# Patient Record
Sex: FEMALE | Race: WHITE | NOT HISPANIC OR LATINO
[De-identification: names, ages, dates, MRNs, and addresses within clinical notes are randomized per-mention and may not be internally consistent; named-entity substitution may affect disease eponyms.]

---

## 2023-07-11 ENCOUNTER — TRANSCRIPTION ENCOUNTER (OUTPATIENT)
Age: 33
End: 2023-07-11

## 2023-07-11 ENCOUNTER — NON-APPOINTMENT (OUTPATIENT)
Age: 33
End: 2023-07-11

## 2023-07-11 ENCOUNTER — APPOINTMENT (OUTPATIENT)
Dept: INTERNAL MEDICINE | Facility: CLINIC | Age: 33
End: 2023-07-11
Payer: COMMERCIAL

## 2023-07-11 VITALS
SYSTOLIC BLOOD PRESSURE: 115 MMHG | DIASTOLIC BLOOD PRESSURE: 80 MMHG | HEART RATE: 88 BPM | TEMPERATURE: 98.2 F | OXYGEN SATURATION: 99 % | RESPIRATION RATE: 15 BRPM | HEIGHT: 67 IN | BODY MASS INDEX: 26.37 KG/M2 | WEIGHT: 168 LBS

## 2023-07-11 DIAGNOSIS — Z78.9 OTHER SPECIFIED HEALTH STATUS: ICD-10-CM

## 2023-07-11 DIAGNOSIS — Z12.4 ENCOUNTER FOR SCREENING FOR MALIGNANT NEOPLASM OF CERVIX: ICD-10-CM

## 2023-07-11 DIAGNOSIS — Z82.49 FAMILY HISTORY OF ISCHEMIC HEART DISEASE AND OTHER DISEASES OF THE CIRCULATORY SYSTEM: ICD-10-CM

## 2023-07-11 DIAGNOSIS — Z60.2 PROBLEMS RELATED TO LIVING ALONE: ICD-10-CM

## 2023-07-11 DIAGNOSIS — Z00.00 ENCOUNTER FOR GENERAL ADULT MEDICAL EXAMINATION W/OUT ABNORMAL FINDINGS: ICD-10-CM

## 2023-07-11 PROCEDURE — G0101: CPT

## 2023-07-11 PROCEDURE — 36415 COLL VENOUS BLD VENIPUNCTURE: CPT

## 2023-07-11 PROCEDURE — 99385 PREV VISIT NEW AGE 18-39: CPT | Mod: 25

## 2023-07-11 SDOH — SOCIAL STABILITY - SOCIAL INSECURITY: PROBLEMS RELATED TO LIVING ALONE: Z60.2

## 2023-07-14 PROBLEM — Z12.4 SCREENING FOR CERVICAL CANCER: Status: RESOLVED | Noted: 2023-07-11 | Resolved: 2023-07-16

## 2023-07-14 PROBLEM — Z78.9 NO FAMILY HISTORY OF COLORECTAL CANCER: Status: ACTIVE | Noted: 2023-07-14

## 2023-07-14 PROBLEM — Z78.9 NO FAMILY HISTORY OF HYPERTENSION: Status: ACTIVE | Noted: 2023-07-14

## 2023-07-14 PROBLEM — Z78.9 NO FAMILY HISTORY OF GLAUCOMA: Status: ACTIVE | Noted: 2023-07-14

## 2023-07-14 PROBLEM — Z78.9 NO FAMILY HISTORY OF OVARIAN CANCER: Status: ACTIVE | Noted: 2023-07-14

## 2023-07-14 PROBLEM — Z00.00 ENCOUNTER FOR PREVENTIVE HEALTH EXAMINATION: Status: ACTIVE | Noted: 2023-06-30

## 2023-07-14 PROBLEM — Z78.9 NO FAMILY HISTORY OF BREAST CANCER: Status: ACTIVE | Noted: 2023-07-14

## 2023-07-14 PROBLEM — Z78.9 NO FAMILY HISTORY OF DIABETES MELLITUS: Status: ACTIVE | Noted: 2023-07-14

## 2023-07-14 PROBLEM — Z60.2 LIVES ALONE: Status: ACTIVE | Noted: 2023-07-14

## 2023-07-14 PROBLEM — Z82.49 FHX: ISCHEMIC HEART DISEASE: Status: ACTIVE | Noted: 2023-07-14

## 2023-07-14 LAB
ALBUMIN SERPL ELPH-MCNC: 4.9 G/DL
ALP BLD-CCNC: 63 U/L
ALT SERPL-CCNC: 11 U/L
ANION GAP SERPL CALC-SCNC: 11 MMOL/L
AST SERPL-CCNC: 22 U/L
BILIRUB SERPL-MCNC: 0.2 MG/DL
BUN SERPL-MCNC: 9 MG/DL
C TRACH RRNA SPEC QL NAA+PROBE: NOT DETECTED
C TRACH RRNA SPEC QL NAA+PROBE: NOT DETECTED
CALCIUM SERPL-MCNC: 10.1 MG/DL
CHLORIDE SERPL-SCNC: 103 MMOL/L
CHOLEST SERPL-MCNC: 213 MG/DL
CO2 SERPL-SCNC: 25 MMOL/L
CREAT SERPL-MCNC: 0.61 MG/DL
CYTOLOGY CVX/VAG DOC THIN PREP: NORMAL
EGFR: 120 ML/MIN/1.73M2
ESTIMATED AVERAGE GLUCOSE: 105 MG/DL
GLUCOSE SERPL-MCNC: 87 MG/DL
HBA1C MFR BLD HPLC: 5.3 %
HBV CORE IGG+IGM SER QL: NONREACTIVE
HBV SURFACE AB SER QL: REACTIVE
HCV AB SER QL: NONREACTIVE
HCV S/CO RATIO: 0.06 S/CO
HDLC SERPL-MCNC: 66 MG/DL
HIV1+2 AB SPEC QL IA.RAPID: NONREACTIVE
HPV HIGH+LOW RISK DNA PNL CVX: NOT DETECTED
LDLC SERPL CALC-MCNC: 130 MG/DL
MEV IGG FLD QL IA: 56.8 AU/ML
MEV IGG+IGM SER-IMP: POSITIVE
N GONORRHOEA RRNA SPEC QL NAA+PROBE: NOT DETECTED
N GONORRHOEA RRNA SPEC QL NAA+PROBE: NOT DETECTED
NONHDLC SERPL-MCNC: 148 MG/DL
POTASSIUM SERPL-SCNC: 4.6 MMOL/L
PROT SERPL-MCNC: 7.3 G/DL
RUBV IGG FLD-ACNC: 7.1 INDEX
RUBV IGG SER-IMP: POSITIVE
SODIUM SERPL-SCNC: 139 MMOL/L
SOURCE AMPLIFICATION: NORMAL
SOURCE TP AMPLIFICATION: NORMAL
T PALLIDUM AB SER QL IA: NEGATIVE
TRIGL SERPL-MCNC: 97 MG/DL

## 2023-07-14 NOTE — PHYSICAL EXAM
[Normal Sclera/Conjunctiva] : normal sclera/conjunctiva [Normal TMs] : both tympanic membranes were normal [No Edema] : there was no peripheral edema [Normal Appearance] : normal in appearance [No Masses] : no palpable masses [No Nipple Discharge] : no nipple discharge [No Axillary Lymphadenopathy] : no axillary lymphadenopathy [Urethral Meatus] : normal urethra [Urinary Bladder Findings] : the bladder was normal on palpation [External Female Genitalia] : normal external genitalia [Vagina] : normal vaginal exam [Cervix] : normal cervix [Uterus] : uterus was normal size, without masses or tenderness [Uterine Adnexae] : normal adnexa [No Skin Lesions] : no skin lesions [Deep Tendon Reflexes (DTR)] : deep tendon reflexes were 2+ and symmetric [Normal] : affect was normal and insight and judgment were intact [de-identified] : Offered chaperone for physical, patient accepted, DEAN Harrell available for chaperoning per patient's request.

## 2023-07-14 NOTE — HISTORY OF PRESENT ILLNESS
[FreeTextEntry1] : Annual check up and meet new provider [de-identified] : Patient presents for her annual check up and to meet new primary care provider. No health issues, no medications.  \par \par No allergies to medications or food. \par \par Surgeries: none\par \par FH: Father with heart disease, \par Mother with uterine fibroids, \par \par No ovarian or breast cancer.  No colon cancer. \par \par Denies any Hypertension, diabetes and no glaucoma in family\par \par SH: lives alone, single, no relationship.  Detector, safe, seat belt, no guns.  Grew up in [place], recently moved from [place] where she was living for 10 years.  Works teacher, special education.\par \par Gyne: never pregnant, last pap smear about 3 years ago.  \par \par \par Immunization: flu shot in 11/22, Covid moderna 2/14/21 and second one as well. \par Tdap 2 years ago

## 2023-07-14 NOTE — HEALTH RISK ASSESSMENT
[PHQ-2 Negative - No further assessment needed] : PHQ-2 Negative - No further assessment needed [[id number]] : 0 [Patient reported PAP Smear was normal] : Patient reported PAP Smear was normal [None] : None [Alone] : lives alone [Employed] : employed [Sexually Active] : sexually active [Feels Safe at Home] : Feels safe at home [Reports changes in hearing] : Reports no changes in hearing [Reports changes in vision] : Reports no changes in vision [Reports changes in dental health] : Reports no changes in dental health [Smoke Detector] : smoke detector [Carbon Monoxide Detector] : carbon monoxide detector [Guns at Home] : no guns at home [Seat Belt] :  uses seat belt [PapSmearDate] : 07/20 [Never] : Never

## 2023-07-14 NOTE — ASSESSMENT
[FreeTextEntry1] : Assessment as indicated above in impressions with plans through orders below. Lab orders placed, serum being drawn in office today.  Reviewed and discussed with patient age appropriate screening and immunizations.  Encouraged obtain new Covid booster simultaneously with flu shot in upcoming Fall season.

## 2024-01-31 ENCOUNTER — NON-APPOINTMENT (OUTPATIENT)
Age: 34
End: 2024-01-31

## 2024-06-06 ENCOUNTER — APPOINTMENT (OUTPATIENT)
Dept: HUMAN REPRODUCTION | Facility: CLINIC | Age: 34
End: 2024-06-06

## 2024-06-26 ENCOUNTER — NON-APPOINTMENT (OUTPATIENT)
Age: 34
End: 2024-06-26

## 2024-07-20 ENCOUNTER — NON-APPOINTMENT (OUTPATIENT)
Age: 34
End: 2024-07-20

## 2024-11-18 ENCOUNTER — NON-APPOINTMENT (OUTPATIENT)
Age: 34
End: 2024-11-18

## 2024-11-26 ENCOUNTER — APPOINTMENT (OUTPATIENT)
Dept: ANTEPARTUM | Facility: CLINIC | Age: 34
End: 2024-11-26
Payer: COMMERCIAL

## 2024-11-26 ENCOUNTER — ASOB RESULT (OUTPATIENT)
Age: 34
End: 2024-11-26

## 2024-11-26 PROCEDURE — 76805 OB US >/= 14 WKS SNGL FETUS: CPT

## 2025-01-14 ENCOUNTER — NON-APPOINTMENT (OUTPATIENT)
Age: 35
End: 2025-01-14

## 2025-04-17 ENCOUNTER — INPATIENT (INPATIENT)
Facility: HOSPITAL | Age: 35
LOS: 3 days | Discharge: HOME | End: 2025-04-21
Attending: STUDENT IN AN ORGANIZED HEALTH CARE EDUCATION/TRAINING PROGRAM | Admitting: STUDENT IN AN ORGANIZED HEALTH CARE EDUCATION/TRAINING PROGRAM
Payer: COMMERCIAL

## 2025-04-17 VITALS
WEIGHT: 210.1 LBS | OXYGEN SATURATION: 98 % | RESPIRATION RATE: 18 BRPM | DIASTOLIC BLOOD PRESSURE: 95 MMHG | HEART RATE: 89 BPM | HEIGHT: 67 IN | TEMPERATURE: 98 F | SYSTOLIC BLOOD PRESSURE: 154 MMHG

## 2025-04-17 LAB
ALBUMIN SERPL ELPH-MCNC: 3.2 G/DL — LOW (ref 3.3–5)
ALP SERPL-CCNC: 110 U/L — SIGNIFICANT CHANGE UP (ref 40–120)
ALT FLD-CCNC: 9 U/L — LOW (ref 10–45)
ANION GAP SERPL CALC-SCNC: 14 MMOL/L — SIGNIFICANT CHANGE UP (ref 5–17)
APPEARANCE UR: ABNORMAL
AST SERPL-CCNC: 18 U/L — SIGNIFICANT CHANGE UP (ref 10–40)
BACTERIA # UR AUTO: ABNORMAL /HPF
BASOPHILS # BLD AUTO: 0.04 K/UL — SIGNIFICANT CHANGE UP (ref 0–0.2)
BASOPHILS NFR BLD AUTO: 0.3 % — SIGNIFICANT CHANGE UP (ref 0–2)
BILIRUB SERPL-MCNC: 0.2 MG/DL — SIGNIFICANT CHANGE UP (ref 0.2–1.2)
BILIRUB UR-MCNC: NEGATIVE — SIGNIFICANT CHANGE UP
BLD GP AB SCN SERPL QL: POSITIVE — SIGNIFICANT CHANGE UP
BUN SERPL-MCNC: 5 MG/DL — LOW (ref 7–23)
CALCIUM SERPL-MCNC: 8.8 MG/DL — SIGNIFICANT CHANGE UP (ref 8.4–10.5)
CAST: 6 /LPF — HIGH (ref 0–4)
CHLORIDE SERPL-SCNC: 105 MMOL/L — SIGNIFICANT CHANGE UP (ref 96–108)
CO2 SERPL-SCNC: 19 MMOL/L — LOW (ref 22–31)
COLOR SPEC: YELLOW — SIGNIFICANT CHANGE UP
CREAT SERPL-MCNC: 0.4 MG/DL — LOW (ref 0.5–1.3)
DIFF PNL FLD: ABNORMAL
EGFR: 132 ML/MIN/1.73M2 — SIGNIFICANT CHANGE UP
EGFR: 132 ML/MIN/1.73M2 — SIGNIFICANT CHANGE UP
EOSINOPHIL # BLD AUTO: 0.08 K/UL — SIGNIFICANT CHANGE UP (ref 0–0.5)
EOSINOPHIL NFR BLD AUTO: 0.7 % — SIGNIFICANT CHANGE UP (ref 0–6)
GLUCOSE SERPL-MCNC: 100 MG/DL — HIGH (ref 70–99)
GLUCOSE UR QL: NEGATIVE MG/DL — SIGNIFICANT CHANGE UP
HCT VFR BLD CALC: 31.5 % — LOW (ref 34.5–45)
HGB BLD-MCNC: 10.9 G/DL — LOW (ref 11.5–15.5)
IMM GRANULOCYTES NFR BLD AUTO: 1 % — HIGH (ref 0–0.9)
KETONES UR-MCNC: NEGATIVE MG/DL — SIGNIFICANT CHANGE UP
LDH SERPL L TO P-CCNC: 203 U/L — SIGNIFICANT CHANGE UP (ref 50–242)
LEUKOCYTE ESTERASE UR-ACNC: ABNORMAL
LYMPHOCYTES # BLD AUTO: 18.4 % — SIGNIFICANT CHANGE UP (ref 13–44)
LYMPHOCYTES # BLD AUTO: 2.25 K/UL — SIGNIFICANT CHANGE UP (ref 1–3.3)
MCHC RBC-ENTMCNC: 30.8 PG — SIGNIFICANT CHANGE UP (ref 27–34)
MCHC RBC-ENTMCNC: 34.6 G/DL — SIGNIFICANT CHANGE UP (ref 32–36)
MCV RBC AUTO: 89 FL — SIGNIFICANT CHANGE UP (ref 80–100)
MONOCYTES # BLD AUTO: 0.61 K/UL — SIGNIFICANT CHANGE UP (ref 0–0.9)
MONOCYTES NFR BLD AUTO: 5 % — SIGNIFICANT CHANGE UP (ref 2–14)
NEUTROPHILS # BLD AUTO: 9.13 K/UL — HIGH (ref 1.8–7.4)
NEUTROPHILS NFR BLD AUTO: 74.6 % — SIGNIFICANT CHANGE UP (ref 43–77)
NITRITE UR-MCNC: NEGATIVE — SIGNIFICANT CHANGE UP
NRBC BLD AUTO-RTO: 0 /100 WBCS — SIGNIFICANT CHANGE UP (ref 0–0)
PH UR: 6.5 — SIGNIFICANT CHANGE UP (ref 5–8)
PLATELET # BLD AUTO: 180 K/UL — SIGNIFICANT CHANGE UP (ref 150–400)
POTASSIUM SERPL-MCNC: 3.3 MMOL/L — LOW (ref 3.5–5.3)
POTASSIUM SERPL-SCNC: 3.3 MMOL/L — LOW (ref 3.5–5.3)
PROT SERPL-MCNC: 5.9 G/DL — LOW (ref 6–8.3)
PROT UR-MCNC: SIGNIFICANT CHANGE UP MG/DL
RBC # BLD: 3.54 M/UL — LOW (ref 3.8–5.2)
RBC # FLD: 13.7 % — SIGNIFICANT CHANGE UP (ref 10.3–14.5)
RBC CASTS # UR COMP ASSIST: 11 /HPF — HIGH (ref 0–4)
REVIEW: SIGNIFICANT CHANGE UP
RH IG SCN BLD-IMP: NEGATIVE — SIGNIFICANT CHANGE UP
RH IG SCN BLD-IMP: NEGATIVE — SIGNIFICANT CHANGE UP
SODIUM SERPL-SCNC: 138 MMOL/L — SIGNIFICANT CHANGE UP (ref 135–145)
SP GR SPEC: 1.02 — SIGNIFICANT CHANGE UP (ref 1–1.03)
SQUAMOUS # UR AUTO: 8 /HPF — HIGH (ref 0–5)
URATE SERPL-MCNC: 3.2 MG/DL — SIGNIFICANT CHANGE UP (ref 2.5–7)
UROBILINOGEN FLD QL: 1 MG/DL — SIGNIFICANT CHANGE UP (ref 0.2–1)
WBC # BLD: 12.23 K/UL — HIGH (ref 3.8–10.5)
WBC # FLD AUTO: 12.23 K/UL — HIGH (ref 3.8–10.5)
WBC UR QL: 111 /HPF — HIGH (ref 0–5)

## 2025-04-17 PROCEDURE — 86077 PHYS BLOOD BANK SERV XMATCH: CPT

## 2025-04-17 RX ORDER — SODIUM CHLORIDE 9 G/1000ML
1000 INJECTION, SOLUTION INTRAVENOUS
Refills: 0 | Status: DISCONTINUED | OUTPATIENT
Start: 2025-04-17 | End: 2025-04-18

## 2025-04-17 RX ORDER — INFLUENZA A VIRUS A/IDAHO/07/2018 (H1N1) ANTIGEN (MDCK CELL DERIVED, PROPIOLACTONE INACTIVATED, INFLUENZA A VIRUS A/INDIANA/08/2018 (H3N2) ANTIGEN (MDCK CELL DERIVED, PROPIOLACTONE INACTIVATED), INFLUENZA B VIRUS B/SINGAPORE/INFTT-16-0610/2016 ANTIGEN (MDCK CELL DERIVED, PROPIOLACTONE INACTIVATED), INFLUENZA B VIRUS B/IOWA/06/2017 ANTIGEN (MDCK CELL DERIVED, PROPIOLACTONE INACTIVATED) 15; 15; 15; 15 UG/.5ML; UG/.5ML; UG/.5ML; UG/.5ML
0.5 INJECTION, SUSPENSION INTRAMUSCULAR ONCE
Refills: 0 | Status: DISCONTINUED | OUTPATIENT
Start: 2025-04-17 | End: 2025-04-21

## 2025-04-17 RX ORDER — OXYTOCIN-SODIUM CHLORIDE 0.9% IV SOLN 30 UNIT/500ML 30-0.9/5 UT/ML-%
167 SOLUTION INTRAVENOUS
Qty: 30 | Refills: 0 | Status: DISCONTINUED | OUTPATIENT
Start: 2025-04-17 | End: 2025-04-18

## 2025-04-17 RX ORDER — CITRIC ACID/SODIUM CITRATE 300-500 MG
15 SOLUTION, ORAL ORAL EVERY 6 HOURS
Refills: 0 | Status: DISCONTINUED | OUTPATIENT
Start: 2025-04-17 | End: 2025-04-18

## 2025-04-17 RX ADMIN — SODIUM CHLORIDE 125 MILLILITER(S): 9 INJECTION, SOLUTION INTRAVENOUS at 21:33

## 2025-04-17 NOTE — OB PROVIDER H&P - HISTORY OF PRESENT ILLNESS
HPI: Pt is a _ yo G_P_  presenting for X.  FM  LOF (color/time)  CTX (frequency, when they started)  VB    Prenatal Issues:   LIST  antepartum admissions  GBS  Sono anomolies, genetic testing, infections, IVF?  EFW    OBHx:  - term, , dates, mode of delivery, indication for c/s, weights, associated complications    Gyn Hx:  - fibroids, ovarian cysts, PID, STDs, abnormal paps, HPV, infertility?, GYN surger?    PMH:   - asthma (last exacerbation, frequency in pregnancy, hospitalizations?, intubations?, steroids)  - thyroid  - DM, HTN, Renal, CV    SHx:  - open vs. laparoscopic    Psych:   - h/o depression/anxiety, PP depression?    Social:  - tobacco, alcohol, drugs in pregnancy and before    Medications:    Allergies:    Will Accept blood transfusion?     HPI: Pt is a 35 yo  at 40w6d presenting for elective induction. +FM. -LOF. -VB. 3 days of irregular abdominal cramping. Seen by OBGYN on , told her cervix was not dilated. Was on baby aspirin QD until 36 wks.    Prenatal Hx:   no antepartum admissions  GBS negative  EFW 4300g  no Sono anomolies, genetic testing, infections, IVF    OBHx:  - , 1 prior miscairrage    Gyn Hx:  - No hx of  fibroids, ovarian cysts, PID, STDs    PMH: none    SHx: none    Psych: none    Social:  - No hx of tobacco, alcohol, drug use in pregnancy and before    Medications: PNV, previously on baby aspirin    Allergies: NKDA       HPI: Pt is a 35 yo  at 40w6d presenting for elective induction. +FM. -LOF. -VB. 3 days of irregular abdominal cramping. Seen by OBGYN on , told her cervix was not dilated. Was on baby aspirin QD until 36 wks.    Prenatal Hx:   no antepartum admissions  GBS negative  EFW 4300g    OBHx:  - , 1 prior miscairrage    Gyn Hx:  - No hx of  fibroids, ovarian cysts, PID, STDs    PMH: none    SHx: none    Psych: none    Social:  - No hx of tobacco, alcohol, drug use in pregnancy and before    Medications: PNV, previously on baby aspirin    Allergies: NKDA

## 2025-04-17 NOTE — OB RN PATIENT PROFILE - HEIGHT IN INCHES
Patient request for refill. Last OV 10/30/20, no future appts scheduled.     Requested Prescriptions     Pending Prescriptions Disp Refills    amitriptyline (ELAVIL) 25 mg tablet 30 Tab 2
7

## 2025-04-17 NOTE — OB PROVIDER H&P - ASSESSMENT
A/P: Pt is a _yo G_P_ who presents [active labor/SROM/induction of labor].  - prenatal issues, GBS status    1. Admit to LND. Routine Labs. IVF  2. Expectant Management vs. IOL  3. Fetus: Cat X tracing, Vertex, EFW _ . C/w EFM.  4+ List all prenatal issues w/ a plan (PEC, GDM, TOLAC, Asthma, etc)  5. GBS status + plan  6. Pain: IV pain meds/epidural PRN           Pt is a 35 yo  at 40w6d presenting for elective induction.    1. Admit to LND. Routine Labs. IVF  2. IOL w/ BCx2 followed by pitocin  3. Fetus: Cat 1 tracing, Vertex, EFW 4300 . C/w EFM.  4. labile BPs: HELLP labs pending, monitor BP   5. GBS neg  6. Pain: IV pain meds/epidural PRN    D/w Dr. Lillie Blevins MS3  Atiya Parson PGY1

## 2025-04-17 NOTE — OB PROVIDER H&P - NSHPPHYSICALEXAM_GEN_ALL_CORE
Vital Signs Last 24 Hrs  T(C): --  T(F): --  HR: 91 (17 Apr 2025 20:46) (88 - 104)  BP: 154/95 (17 Apr 2025 20:37) (154/95 - 154/95)  BP(mean): --  RR: --  SpO2: 95% (17 Apr 2025 20:46) (93% - 95%)        Physical Exam:  Gen: NAD, AOx3  CV: RR  Resp: unlabored respirations  Abd: soft, NT, ND    Speculum Exam:   - pooling, nitrazine, ferning, bleeding   - (lesions if pt has history of HSV)    SVE:  FHT:  [place]:  EFW: Sono/leopolds  Sono: fetal presentation, placentation  BPP: Vital Signs Last 24 Hrs  T(C): --  T(F): --  HR: 91 (17 Apr 2025 20:46) (88 - 104)  BP: 154/95 (17 Apr 2025 20:37) (154/95 - 154/95)  BP(mean): --  RR: --  SpO2: 95% (17 Apr 2025 20:46) (93% - 95%)        Physical Exam:  Gen: NAD, AOx3  CV: RR  Resp: unlabored respirations  Abd: soft, NT,     FHT: baseline 150/mod variability/-accels/-decels  [place]: irregular  Sono: vertex

## 2025-04-17 NOTE — OB PROVIDER H&P - ATTENDING COMMENTS
33 yo  at 40w6d admitted for eIOL. Plan for buccal cytotec, then pitocin. GBS negative. Fetal and maternal status reassuring.

## 2025-04-17 NOTE — OB PROVIDER H&P - HBSAG: DATE, OB PROFILE
Discussion/Summary   Dear Wanda,    please review results of the blood test.  Overall everything came back normal including a blood count, kidney and liver function, cholesterol, thyroid.   There is slightly elevated blood sugar but as far as I remember you ate something before blood work   And it will explain this.              Verified Results  COMP METABOLIC PANEL WITH CBCA, LIPID PANEL AND TSH (CMP,CBCA,LIPFA,TSH) [id number] 01:05PM CHANDANA OLIVO     Test Name Result Flag Reference   WHITE BLOOD COUNT 6.9 K/mcL  4.2-11.0   RED CELL COUNT 4.41 mil/mcL  4.00-5.20   HEMOGLOBIN 13.4 g/dl  12.0-15.5   HEMATOCRIT 40.6 %  36.0-46.5   MEAN CORPUSCULAR VOLUME 92.1 fL  78.0-100.0   MEAN CORPUSCULAR HEMOGLOBIN 30.4 pg  26.0-34.0   MEAN CORPUSCULAR HGB CONC 33.0 g/dl  32.0-36.5   RDW-CV 13.1 %  11.0-15.0   PLATELET COUNT 212 K/mcL  140-450   DIFF TYPE      AUTOMATED DIFFERENTIAL   AYESHA% 66 %     LYM% 25 %     MON% 7 %     EOS% 2 %     BASO% 0 %     AYESHA ABS 4.5 K/mcL  1.8-7.7   LYM ABS 1.7 K/mcL  1.0-4.8   MON ABS 0.5 K/mcL  0.3-0.9   EOS ABS 0.1 K/mcL  0.1-0.5   BASO ABS 0.0 K/mcL  0.0-0.3   FASTING STATUS UNKNOWN hrs     SODIUM 138 mmol/L  135-145   POTASSIUM 4.4 mmol/L  3.4-5.1   CHLORIDE 105 mmol/L     CARBON DIOXIDE 25 mmol/L  21-32   ANION GAP 12 mmol/L  10-20   GLUCOSE 116 mg/dl H 65-99   BUN 11 mg/dl  6-20   CREATININE 0.57 mg/dl  0.51-0.95   GFR EST.AFRICAN AMER >90     eGFR results = or >90 mL/min/1.73m2 = Normal kidney function.   GFR EST.NONAFRI AMER >90     eGFR results = or >90 mL/min/1.73m2 = Normal kidney function.   BUN/CREATININE RATIO 19  7-25   CALCIUM 9.7 mg/dl  8.4-10.2   BILIRUBIN TOTAL 1.0 mg/dl  0.2-1.0   GOT/AST 9 Units/L  <38   GPT/ALT 13 Units/L  <79   ALKALINE PHOSPHATASE 61 Units/L     TOTAL PROTEIN 7.2 g/dl  6.4-8.2   ALBUMIN 4.3 g/dl  3.6-5.1   GLOBULIN (CALCULATED) 2.9 g/dl  2.0-4.0   A/G RATIO 1.5  1.0-2.4   FASTING STATUS UNKNOWN hrs     CHOLESTEROL 179 mg/dl  <200    Desirable            <200  Borderline High      200 to 239  High                 >=240   LDL CHOLESTEROL (CALCULATED) 91 mg/dl  <130   OPTIMAL               <100  NEAR OPTIMAL          100-129  BORDERLINE HIGH       130-159  HIGH                  160-189  VERY HIGH             >=190   HDL CHOLESTEROL 74 mg/dl  >49   Low            <40  Borderline Low 40 to 49  Near Optimal   50 to 59  Optimal        >=60   TRIGLYCERIDES 72 mg/dl  <150   Normal                   <150  Borderline High          150 to 199  High                     200 to 499  Very High                >=500   NON-HDL CHOLESTEROL 105 mg/dl     Therapeutic Target:  CHD and risk equivalents <130  Multiple risk factors    <160  0 to 1 risk factors      <190   CHOLESTEROL/HDL RATIO 2.4  <4.5   TSH 1.967 mcUnits/mL  0.350-5.000        23-Aug-2024

## 2025-04-18 ENCOUNTER — TRANSCRIPTION ENCOUNTER (OUTPATIENT)
Age: 35
End: 2025-04-18

## 2025-04-18 LAB
ALBUMIN SERPL ELPH-MCNC: 3.5 G/DL — SIGNIFICANT CHANGE UP (ref 3.3–5)
ALP SERPL-CCNC: 127 U/L — HIGH (ref 40–120)
ALT FLD-CCNC: 10 U/L — SIGNIFICANT CHANGE UP (ref 10–45)
ANION GAP SERPL CALC-SCNC: 17 MMOL/L — SIGNIFICANT CHANGE UP (ref 5–17)
AST SERPL-CCNC: 19 U/L — SIGNIFICANT CHANGE UP (ref 10–40)
BASOPHILS # BLD AUTO: 0.05 K/UL — SIGNIFICANT CHANGE UP (ref 0–0.2)
BASOPHILS NFR BLD AUTO: 0.3 % — SIGNIFICANT CHANGE UP (ref 0–2)
BILIRUB SERPL-MCNC: 0.4 MG/DL — SIGNIFICANT CHANGE UP (ref 0.2–1.2)
BUN SERPL-MCNC: 4 MG/DL — LOW (ref 7–23)
CALCIUM SERPL-MCNC: 8.2 MG/DL — LOW (ref 8.4–10.5)
CHLORIDE SERPL-SCNC: 101 MMOL/L — SIGNIFICANT CHANGE UP (ref 96–108)
CO2 SERPL-SCNC: 19 MMOL/L — LOW (ref 22–31)
CREAT ?TM UR-MCNC: 114 MG/DL — SIGNIFICANT CHANGE UP
CREAT SERPL-MCNC: 0.43 MG/DL — LOW (ref 0.5–1.3)
EGFR: 130 ML/MIN/1.73M2 — SIGNIFICANT CHANGE UP
EGFR: 130 ML/MIN/1.73M2 — SIGNIFICANT CHANGE UP
EOSINOPHIL # BLD AUTO: 0.01 K/UL — SIGNIFICANT CHANGE UP (ref 0–0.5)
EOSINOPHIL NFR BLD AUTO: 0.1 % — SIGNIFICANT CHANGE UP (ref 0–6)
GLUCOSE SERPL-MCNC: 116 MG/DL — HIGH (ref 70–99)
HCT VFR BLD CALC: 35.1 % — SIGNIFICANT CHANGE UP (ref 34.5–45)
HGB BLD-MCNC: 12.5 G/DL — SIGNIFICANT CHANGE UP (ref 11.5–15.5)
IMM GRANULOCYTES NFR BLD AUTO: 0.9 % — SIGNIFICANT CHANGE UP (ref 0–0.9)
LDH SERPL L TO P-CCNC: 189 U/L — SIGNIFICANT CHANGE UP (ref 50–242)
LYMPHOCYTES # BLD AUTO: 1.19 K/UL — SIGNIFICANT CHANGE UP (ref 1–3.3)
LYMPHOCYTES # BLD AUTO: 6.4 % — LOW (ref 13–44)
MAGNESIUM SERPL-MCNC: 3.7 MG/DL — HIGH (ref 1.6–2.6)
MAGNESIUM SERPL-MCNC: 4.2 MG/DL — HIGH (ref 1.6–2.6)
MAGNESIUM SERPL-MCNC: 4.9 MG/DL — HIGH (ref 1.6–2.6)
MCHC RBC-ENTMCNC: 31.5 PG — SIGNIFICANT CHANGE UP (ref 27–34)
MCHC RBC-ENTMCNC: 35.6 G/DL — SIGNIFICANT CHANGE UP (ref 32–36)
MCV RBC AUTO: 88.4 FL — SIGNIFICANT CHANGE UP (ref 80–100)
MONOCYTES # BLD AUTO: 1.02 K/UL — HIGH (ref 0–0.9)
MONOCYTES NFR BLD AUTO: 5.5 % — SIGNIFICANT CHANGE UP (ref 2–14)
NEUTROPHILS # BLD AUTO: 16.11 K/UL — HIGH (ref 1.8–7.4)
NEUTROPHILS NFR BLD AUTO: 86.8 % — HIGH (ref 43–77)
NRBC BLD AUTO-RTO: 0 /100 WBCS — SIGNIFICANT CHANGE UP (ref 0–0)
PLATELET # BLD AUTO: 184 K/UL — SIGNIFICANT CHANGE UP (ref 150–400)
POTASSIUM SERPL-MCNC: 3.1 MMOL/L — LOW (ref 3.5–5.3)
POTASSIUM SERPL-SCNC: 3.1 MMOL/L — LOW (ref 3.5–5.3)
PROT ?TM UR-MCNC: 23 MG/DL — HIGH (ref 0–12)
PROT SERPL-MCNC: 6.5 G/DL — SIGNIFICANT CHANGE UP (ref 6–8.3)
PROT/CREAT UR-RTO: 0.2 RATIO — SIGNIFICANT CHANGE UP (ref 0–0.2)
RBC # BLD: 3.97 M/UL — SIGNIFICANT CHANGE UP (ref 3.8–5.2)
RBC # FLD: 13.6 % — SIGNIFICANT CHANGE UP (ref 10.3–14.5)
SODIUM SERPL-SCNC: 137 MMOL/L — SIGNIFICANT CHANGE UP (ref 135–145)
T PALLIDUM AB TITR SER: NEGATIVE — SIGNIFICANT CHANGE UP
URATE SERPL-MCNC: 3.3 MG/DL — SIGNIFICANT CHANGE UP (ref 2.5–7)
WBC # BLD: 18.54 K/UL — HIGH (ref 3.8–10.5)
WBC # FLD AUTO: 18.54 K/UL — HIGH (ref 3.8–10.5)

## 2025-04-18 PROCEDURE — 59514 CESAREAN DELIVERY ONLY: CPT | Mod: AS,U9

## 2025-04-18 PROCEDURE — 88307 TISSUE EXAM BY PATHOLOGIST: CPT | Mod: 26

## 2025-04-18 RX ORDER — KETOROLAC TROMETHAMINE 30 MG/ML
30 INJECTION, SOLUTION INTRAMUSCULAR; INTRAVENOUS EVERY 6 HOURS
Refills: 0 | Status: DISCONTINUED | OUTPATIENT
Start: 2025-04-18 | End: 2025-04-19

## 2025-04-18 RX ORDER — NALOXONE HYDROCHLORIDE 0.4 MG/ML
0.1 INJECTION, SOLUTION INTRAMUSCULAR; INTRAVENOUS; SUBCUTANEOUS
Refills: 0 | Status: DISCONTINUED | OUTPATIENT
Start: 2025-04-18 | End: 2025-04-19

## 2025-04-18 RX ORDER — ACETAMINOPHEN 500 MG/5ML
975 LIQUID (ML) ORAL
Refills: 0 | Status: DISCONTINUED | OUTPATIENT
Start: 2025-04-18 | End: 2025-04-21

## 2025-04-18 RX ORDER — OXYCODONE HYDROCHLORIDE 30 MG/1
5 TABLET ORAL
Refills: 0 | Status: DISCONTINUED | OUTPATIENT
Start: 2025-04-18 | End: 2025-04-21

## 2025-04-18 RX ORDER — MAGNESIUM SULFATE 500 MG/ML
4 SYRINGE (ML) INJECTION ONCE
Refills: 0 | Status: COMPLETED | OUTPATIENT
Start: 2025-04-18 | End: 2025-04-18

## 2025-04-18 RX ORDER — SODIUM CHLORIDE 9 G/1000ML
1000 INJECTION, SOLUTION INTRAVENOUS
Refills: 0 | Status: DISCONTINUED | OUTPATIENT
Start: 2025-04-18 | End: 2025-04-21

## 2025-04-18 RX ORDER — OXYTOCIN-SODIUM CHLORIDE 0.9% IV SOLN 30 UNIT/500ML 30-0.9/5 UT/ML-%
2 SOLUTION INTRAVENOUS
Qty: 30 | Refills: 0 | Status: DISCONTINUED | OUTPATIENT
Start: 2025-04-18 | End: 2025-04-19

## 2025-04-18 RX ORDER — SIMETHICONE 80 MG
80 TABLET,CHEWABLE ORAL EVERY 4 HOURS
Refills: 0 | Status: DISCONTINUED | OUTPATIENT
Start: 2025-04-18 | End: 2025-04-21

## 2025-04-18 RX ORDER — DIPHENHYDRAMINE HCL 12.5MG/5ML
25 ELIXIR ORAL EVERY 6 HOURS
Refills: 0 | Status: DISCONTINUED | OUTPATIENT
Start: 2025-04-18 | End: 2025-04-21

## 2025-04-18 RX ORDER — NIFEDIPINE 30 MG
30 TABLET, EXTENDED RELEASE 24 HR ORAL DAILY
Refills: 0 | Status: DISCONTINUED | OUTPATIENT
Start: 2025-04-18 | End: 2025-04-21

## 2025-04-18 RX ORDER — CEFAZOLIN SODIUM IN 0.9 % NACL 3 G/100 ML
2000 INTRAVENOUS SOLUTION, PIGGYBACK (ML) INTRAVENOUS ONCE
Refills: 0 | Status: COMPLETED | OUTPATIENT
Start: 2025-04-18 | End: 2025-04-18

## 2025-04-18 RX ORDER — OXYCODONE HYDROCHLORIDE 30 MG/1
5 TABLET ORAL ONCE
Refills: 0 | Status: DISCONTINUED | OUTPATIENT
Start: 2025-04-18 | End: 2025-04-21

## 2025-04-18 RX ORDER — NIFEDIPINE 30 MG
10 TABLET, EXTENDED RELEASE 24 HR ORAL ONCE
Refills: 0 | Status: COMPLETED | OUTPATIENT
Start: 2025-04-18 | End: 2025-04-18

## 2025-04-18 RX ORDER — MODIFIED LANOLIN 100 %
1 CREAM (GRAM) TOPICAL EVERY 6 HOURS
Refills: 0 | Status: DISCONTINUED | OUTPATIENT
Start: 2025-04-18 | End: 2025-04-21

## 2025-04-18 RX ORDER — ONDANSETRON HCL/PF 4 MG/2 ML
4 VIAL (ML) INJECTION EVERY 6 HOURS
Refills: 0 | Status: DISCONTINUED | OUTPATIENT
Start: 2025-04-18 | End: 2025-04-19

## 2025-04-18 RX ORDER — MAGNESIUM HYDROXIDE 400 MG/5ML
30 SUSPENSION ORAL
Refills: 0 | Status: DISCONTINUED | OUTPATIENT
Start: 2025-04-18 | End: 2025-04-21

## 2025-04-18 RX ORDER — NALBUPHINE HYDROCHLORIDE 10 MG/ML
2.5 INJECTION INTRAMUSCULAR; INTRAVENOUS; SUBCUTANEOUS EVERY 6 HOURS
Refills: 0 | Status: DISCONTINUED | OUTPATIENT
Start: 2025-04-18 | End: 2025-04-19

## 2025-04-18 RX ORDER — MAGNESIUM SULFATE 500 MG/ML
2 SYRINGE (ML) INJECTION
Qty: 40 | Refills: 0 | Status: DISCONTINUED | OUTPATIENT
Start: 2025-04-18 | End: 2025-04-19

## 2025-04-18 RX ORDER — OXYTOCIN-SODIUM CHLORIDE 0.9% IV SOLN 30 UNIT/500ML 30-0.9/5 UT/ML-%
42 SOLUTION INTRAVENOUS
Qty: 30 | Refills: 0 | Status: DISCONTINUED | OUTPATIENT
Start: 2025-04-18 | End: 2025-04-21

## 2025-04-18 RX ORDER — DEXAMETHASONE 0.5 MG/1
4 TABLET ORAL EVERY 6 HOURS
Refills: 0 | Status: DISCONTINUED | OUTPATIENT
Start: 2025-04-18 | End: 2025-04-19

## 2025-04-18 RX ORDER — HEPARIN SODIUM 1000 [USP'U]/ML
5000 INJECTION INTRAVENOUS; SUBCUTANEOUS
Refills: 0 | Status: DISCONTINUED | OUTPATIENT
Start: 2025-04-18 | End: 2025-04-21

## 2025-04-18 RX ORDER — IBUPROFEN 200 MG
600 TABLET ORAL EVERY 6 HOURS
Refills: 0 | Status: COMPLETED | OUTPATIENT
Start: 2025-04-18 | End: 2026-03-17

## 2025-04-18 RX ORDER — CLOSTRIDIUM TETANI TOXOID ANTIGEN (FORMALDEHYDE INACTIVATED), CORYNEBACTERIUM DIPHTHERIAE TOXOID ANTIGEN (FORMALDEHYDE INACTIVATED), BORDETELLA PERTUSSIS TOXOID ANTIGEN (GLUTARALDEHYDE INACTIVATED), BORDETELLA PERTUSSIS FILAMENTOUS HEMAGGLUTININ ANTIGEN (FORMALDEHYDE INACTIVATED), BORDETELLA PERTUSSIS PERTACTIN ANTIGEN, AND BORDETELLA PERTUSSIS FIMBRIAE 2/3 ANTIGEN 5; 2; 2.5; 5; 3; 5 [LF]/.5ML; [LF]/.5ML; UG/.5ML; UG/.5ML; UG/.5ML; UG/.5ML
0.5 INJECTION, SUSPENSION INTRAMUSCULAR ONCE
Refills: 0 | Status: DISCONTINUED | OUTPATIENT
Start: 2025-04-18 | End: 2025-04-21

## 2025-04-18 RX ADMIN — Medication 300 GRAM(S): at 02:16

## 2025-04-18 RX ADMIN — HEPARIN SODIUM 5000 UNIT(S): 1000 INJECTION INTRAVENOUS; SUBCUTANEOUS at 18:14

## 2025-04-18 RX ADMIN — Medication 100 MILLIGRAM(S): at 15:49

## 2025-04-18 RX ADMIN — Medication 975 MILLIGRAM(S): at 23:55

## 2025-04-18 RX ADMIN — SODIUM CHLORIDE 125 MILLILITER(S): 9 INJECTION, SOLUTION INTRAVENOUS at 20:32

## 2025-04-18 RX ADMIN — Medication 975 MILLIGRAM(S): at 18:33

## 2025-04-18 RX ADMIN — Medication 30 MILLIGRAM(S): at 02:15

## 2025-04-18 RX ADMIN — KETOROLAC TROMETHAMINE 30 MILLIGRAM(S): 30 INJECTION, SOLUTION INTRAMUSCULAR; INTRAVENOUS at 21:32

## 2025-04-18 RX ADMIN — Medication 50 GM/HR: at 18:14

## 2025-04-18 RX ADMIN — Medication 10 MILLIGRAM(S): at 03:37

## 2025-04-18 RX ADMIN — KETOROLAC TROMETHAMINE 30 MILLIGRAM(S): 30 INJECTION, SOLUTION INTRAMUSCULAR; INTRAVENOUS at 15:02

## 2025-04-18 RX ADMIN — KETOROLAC TROMETHAMINE 30 MILLIGRAM(S): 30 INJECTION, SOLUTION INTRAMUSCULAR; INTRAVENOUS at 20:33

## 2025-04-18 RX ADMIN — Medication 975 MILLIGRAM(S): at 13:00

## 2025-04-18 RX ADMIN — Medication 50 GM/HR: at 20:32

## 2025-04-18 RX ADMIN — Medication 50 GM/HR: at 02:49

## 2025-04-18 RX ADMIN — KETOROLAC TROMETHAMINE 30 MILLIGRAM(S): 30 INJECTION, SOLUTION INTRAMUSCULAR; INTRAVENOUS at 15:30

## 2025-04-18 RX ADMIN — Medication 975 MILLIGRAM(S): at 18:14

## 2025-04-18 NOTE — OB NEONATOLOGY/PEDIATRICIAN DELIVERY SUMMARY - NSPHYSEXAMA_OBGYN_ALL_OB
Unremarkable Retention Suture Text: Retention sutures were placed to support the closure and prevent dehiscence.

## 2025-04-18 NOTE — OB RN DELIVERY SUMMARY - NS_LABORCHARACTER_OBGYN_ALL_OB
Induction of labor-AROM/Induction of labor-Medicinal/External electronic FM/Antibiotics in labor/Meconium staining/Fetal intolerance

## 2025-04-18 NOTE — DISCHARGE NOTE OB - ADDITIONAL INSTRUCTIONS
Please follow up with your doctor in 6 weeks for your postpartum visit and 1 week for your incision and BP check.

## 2025-04-18 NOTE — OB PROVIDER DELIVERY SUMMARY - NSSELHIDDEN_OBGYN_ALL_OB_FT
[NS_DeliveryAttending1_OBGYN_ALL_OB_FT:[id number]],[NS_DeliveryAssist1_OBGYN_ALL_OB_FT:[id number]]

## 2025-04-18 NOTE — OB PROVIDER DELIVERY SUMMARY - NSLOWPPHRISK_OBGYN_A_OB
No previous uterine incision/[place] Pregnancy/Less than or equal to 4 previous vaginal births/No known bleeding disorder/No history of postpartum hemorrhage/No other PPH risks indicated

## 2025-04-18 NOTE — DISCHARGE NOTE OB - CARE PROVIDER_API CALL
Nataliia Palmer  Obstetrics and Gynecology  [address], [address]  [place] [address]  Phone: [phone]  Fax: [phone]  Established Patient  Follow Up Time: 1 week

## 2025-04-18 NOTE — DISCHARGE NOTE OB - NURSING SECTION COMPLETE
PALLIATIVE MEDICINE INTERDISCIPLINARY TEAM NOTE    Provider:  [ x  ]Social Work   [   ]          [ x  ] Initial visit [   ] Follow up    Family or contact name / phone #   Met with: [ x  ] Patient:  patient was observed to be resting comfortably   [ x  ] Family:  daughter, Judy Jackson, was at bedside  [   ] Other:    Primary Language: [   ] English [   ] Other*:                      *Interpretation provided by:    SUPPORT DIAGNOSES            (Check all that apply)  [   ] Psychosocial spiritual assessment (PSSA)  [   ] EOL issues  [   ] Cultural / spiritual concerns  [ x  ] Pain / suffering  [ x  ] Dementia / AMS  [   ] Other:  [   ] AD issues  [  x ] Grief / loss / sadness  [   ] Discharge issues  [  x ] Distress / coping    PSYCHOSOCIAL ASSESSMENT OF PATIENT         (Check all that apply)  [ x  ] Initial Assessment            [   ] Reassessment          [   ] Not Applicable this visit    Pain/suffering acuity:  patietn appeared to be resting comfortably  [   ] None to mild (0-3)           [   ] Moderate (4-6)        [   ] High (7-10)    Mental Status:  [   ] Alert/oriented (x3)          [   ] Confused/Altered(x2/x1)         [  x ] Non-resp    Functional status:  [   ] Independent w ADLs      [   ] Needs Assistance             [ x  ] Bedbound/Full Care    Coping: unable to assess  [   ] Coping well                     [   ] Coping w/difficulty            [   ] Poor coping    Support system:  [ x  ] Strong                              [   ] Adequate                        [   ] Inadequate      Past history and medications for:     [ ] Anxiety       [ ] Depression    [ ] Sleep disorders     SPIRITUAL ASSESSMENT  Evangelical/Spiritual practice: ___________________________    Role of organized Methodist:  [   ] Important                     [   ] Some (fam tradition, cultural)               [   ] None    Effects on medical care:  [   ] Yes, _____________________________________                         [   ] None    Cultural/Shinto need:  [   ] Yes, _____________________________________                         [   ] None    Refer to Pastoral Care:  [   ] Yes           [   ] No, not at this time    SERVICE PROVIDED  [   ]PSSA                                                                             [   ]Discharge support / facilitation  [ x  ]AD / goals of care counseling                                  [   ]EOL / death / bereavement counseling  [  x ]Counseling / support                                                [   ] Family meeting  [   ]Prayer / sacrament / ritual                                      [   ] Referral   [   ]Other                                                                       NOTE and Plan of Care (PoC):    Patient is an 82 year old male.   Patient was admitted from [facility], on 10/23/22, dx:  Hypernatremia, Anemia.  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  Patient presented for abnormal labs.  At baseline patient is unresponsive.    Patient was observed to be resting comfortably.  Daughter was at bedside.  Reviewed role of Palliative Care.  Support rendered. Patient/Caregiver provided printed discharge information.

## 2025-04-18 NOTE — DISCHARGE NOTE OB - MEDICATION SUMMARY - MEDICATIONS TO TAKE
I will START or STAY ON the medications listed below when I get home from the hospital:    ibuprofen 600 mg oral tablet  -- 1 tab(s) by mouth every 6 hours  -- Indication: For Single delivery by  section    acetaminophen 325 mg oral tablet  -- 3 tab(s) by mouth every 6 hours  -- Indication: For Single delivery by  section    NIFEdipine 30 mg oral tablet, extended release  -- 1 tab(s) by mouth once a day  -- Indication: For Severe preeclampsia, third trimester   I will START or STAY ON the medications listed below when I get home from the hospital:    ibuprofen 600 mg oral tablet  -- 1 tab(s) by mouth every 6 hours  -- Indication: For Single delivery by  section    acetaminophen 325 mg oral tablet  -- 3 tab(s) by mouth every 6 hours  -- Indication: For Single delivery by  section    NIFEdipine 30 mg oral tablet, extended release  -- 1 tab(s) by mouth once a day Hold for BP <110/70  -- Indication: For Severe preeclampsia, third trimester

## 2025-04-18 NOTE — OB NEONATOLOGY/PEDIATRICIAN DELIVERY SUMMARY - NSPEDSNEONOTESA_OBGYN_ALL_OB_FT
Peds NP in attendance at delivery as requested for NRFHT and mom on magnesium. 41 wk male born via primary, unscheduled CS on  at 0829 to a 33 y/o  blood type A- mother (Rhogam received @ 28 weeks). Maternal history significant for preeclampsia, started on Mag this morning @ 0227. No significant prenatal history. PNL as follows: HIV -, Hep B -, Hep C -, RPR NR, Rubella I, GBS - on 3/17. AROM at 0554 with clear fluid, however thick meconium present at delivery. Nuchal cord x 1.  Baby emerged vigorous, crying, was warmed, dried, suctioned and stimulated with APGARS of 9/9. Mom plans to initiate breastfeeding. Consents to Hep B vaccine and consents to circ.   Highest maternal temp 36.9. EOS 0.09.

## 2025-04-18 NOTE — DISCHARGE NOTE OB - CARE PLAN
Principal Discharge DX:	Single delivery by  section  Assessment and plan of treatment:	If you experience any of the following, please notify your provider:  -fever >100.4F  -increased vaginal bleeding or clotting (saturating a pad an hour)  -foul smelling discharge or bloody discharge from your incision site  -severe abdominal, vaginal, or rectal pain   -persistent headache or vision changes  -swollen areas on your legs that are red, hot, or painful   -swollen, hot, painful areas and/or streaks on your breasts  -cracked or bleeding nipples  -mood swings, depression, or crying spells lasting more than 3 days     Nothing in the vagina for 6 weeks (no douching, sex or tampons). No baths, you may shower.  Secondary Diagnosis:	Severe preeclampsia, third trimester   1

## 2025-04-18 NOTE — OB RN DELIVERY SUMMARY - NS_SEPSISRSKCALC_OBGYN_ALL_OB_FT
EOS calculated successfully. EOS Risk Factor: 0.5/1000 live births ([facility] national incidence); GA=40w6d; Temp=98.42; ROM=2.583; GBS='Negative'; Antibiotics='Broad spectrum antibiotics > 4 hrs prior to birth'

## 2025-04-18 NOTE — DISCHARGE NOTE OB - HOSPITAL COURSE
s/p pCS for cat II tracing, on Mg for sPEC. All postpartum milestones met, Stable for discharge s/p pCS for cat II tracing, on Mg for sPEC. All postpartum milestones met, Stable for discharge on pod3

## 2025-04-18 NOTE — OB RN INTRAOPERATIVE NOTE - NSSELHIDDEN_OBGYN_ALL_OB_FT
[NS_DeliveryAttending1_OBGYN_ALL_OB_FT:[id number]],[NS_DeliveryAssist1_OBGYN_ALL_OB_FT:[id number]],[NS_DeliveryRN_OBGYN_ALL_OB_FT:[id number]]

## 2025-04-18 NOTE — OB RN DELIVERY SUMMARY - NSSELHIDDEN_OBGYN_ALL_OB_FT
[NS_DeliveryAttending1_OBGYN_ALL_OB_FT:[id number]],[NS_DeliveryAssist1_OBGYN_ALL_OB_FT:[id number]],[NS_DeliveryRN_OBGYN_ALL_OB_FT:[id number]]

## 2025-04-18 NOTE — DISCHARGE NOTE OB - PATIENT PORTAL LINK FT
You can access the FollowMyHealth Patient Portal offered by [facility] by registering at the following website: [email or web address]. By joining "[employer]’s FollowMyHealth portal, you will also be able to view your health information using other applications (apps) compatible with our system.

## 2025-04-18 NOTE — OB PROVIDER DELIVERY SUMMARY - NSPROVIDERDELIVERYNOTE_OBGYN_ALL_OB_FT
unscheduled pLTCS for category 2 tracing    Viable male infant, apgars 9/9 thick meconium  Hysterotomy closed in 1 layer using vicryl  Grossly normal uterus, tubes, and ovaries  Abd closed in standard fashion  patient and infant to recovery in stable condition    QBL: 725  IV: 1500  urine: 150

## 2025-04-18 NOTE — DISCHARGE NOTE OB - FINANCIAL ASSISTANCE
[facility] provides services at a reduced cost to those who are determined to be eligible through [facility]’s financial assistance program. Information regarding [facility]’s financial assistance program can be found by going to [email or web address] or by calling [phone].

## 2025-04-18 NOTE — CHART NOTE - NSCHARTNOTEFT_GEN_A_CORE
PA Chart Note    Subjective  Pt evaluated at bedside for sustained severe range BPs. Pt denies headaches, visual disturbances, RUQ pain, epigastric pain or any other concerns.     Objective  ICU Vital Signs Last 24 Hrs  T(C): 36.6 (2025 23:13), Max: 36.7 (2025 20:23)  T(F): 97.88 (2025 23:13), Max: 98.1 (2025 20:23)  HR: 45 (2025 02:12) (45 - 116)  BP: 156/94 (2025 01:55) (125/81 - 173/91)  RR: 18 (2025 20:52) (18 - 18)  SpO2: 83% (2025 02:12) (75% - 100%)    O2 Parameters below as of 2025 20:23  Patient On (Oxygen Delivery Method): room air    Physical Exam  General: NAD  Pulm: Non-labored breathing on RA  Cardiac: RRR  Abdomen: Gravid  Extremities: Moving with ease    Assessment/Plan  34F  41wks gestational age initially admitted for an eIOL, now LT. Pt also now meeting criteria for sPEC in the setting of sustained severe-range BPs. Pt asymptomatic.     -The diagnosis was explained to pt thoroughly  -Procardia 10 IR ordered however held as most recent BP after the sustained severe ranges was 156/94. Proceed with Procardia 10 IR if an additional severe range BP occurs within the hour  -Procardia 30 XL ordered for maintenance BP control  -Continue BP monitoring and for S/S of sPEC and/or eclampsia  -Magnesium for seizure prophylaxis ddiscussed with pt thoroughly. Magnesium was initiated and to be discontinued 24 hours postpartum  -Pt verbalized understanding and agreement to plan, all questions answered    Discussed with Dr. Lillie Gates PA-C
Reviewed with patient persistent cat 2 FHT. Patient with minimal variability. While patient is on magnesium sulfate for preeclampsia with severe features, fetal heart rate with minimal response to scalp stim. Plan for IV fluid bolus, repositioning, and re-examination in 20 minutes. PTA held with , charge RN, and patient's nurse.

## 2025-04-19 LAB
ALBUMIN SERPL ELPH-MCNC: 2.7 G/DL — LOW (ref 3.3–5)
ALP SERPL-CCNC: 84 U/L — SIGNIFICANT CHANGE UP (ref 40–120)
ALT FLD-CCNC: 7 U/L — LOW (ref 10–45)
ANION GAP SERPL CALC-SCNC: 11 MMOL/L — SIGNIFICANT CHANGE UP (ref 5–17)
AST SERPL-CCNC: 16 U/L — SIGNIFICANT CHANGE UP (ref 10–40)
BASOPHILS # BLD AUTO: 0 K/UL — SIGNIFICANT CHANGE UP (ref 0–0.2)
BASOPHILS NFR BLD AUTO: 0 % — SIGNIFICANT CHANGE UP (ref 0–2)
BILIRUB SERPL-MCNC: 0.2 MG/DL — SIGNIFICANT CHANGE UP (ref 0.2–1.2)
BUN SERPL-MCNC: 6 MG/DL — LOW (ref 7–23)
CALCIUM SERPL-MCNC: 6.9 MG/DL — LOW (ref 8.4–10.5)
CHLORIDE SERPL-SCNC: 103 MMOL/L — SIGNIFICANT CHANGE UP (ref 96–108)
CO2 SERPL-SCNC: 21 MMOL/L — LOW (ref 22–31)
CREAT SERPL-MCNC: 0.53 MG/DL — SIGNIFICANT CHANGE UP (ref 0.5–1.3)
EGFR: 124 ML/MIN/1.73M2 — SIGNIFICANT CHANGE UP
EGFR: 124 ML/MIN/1.73M2 — SIGNIFICANT CHANGE UP
EOSINOPHIL # BLD AUTO: 0 K/UL — SIGNIFICANT CHANGE UP (ref 0–0.5)
EOSINOPHIL NFR BLD AUTO: 0 % — SIGNIFICANT CHANGE UP (ref 0–6)
GIANT PLATELETS BLD QL SMEAR: PRESENT — SIGNIFICANT CHANGE UP
GLUCOSE SERPL-MCNC: 109 MG/DL — HIGH (ref 70–99)
HCT VFR BLD CALC: 22.3 % — LOW (ref 34.5–45)
HCT VFR BLD CALC: 22.8 % — LOW (ref 34.5–45)
HGB BLD-MCNC: 7.4 G/DL — LOW (ref 11.5–15.5)
HGB BLD-MCNC: 7.7 G/DL — LOW (ref 11.5–15.5)
KLEIHAUER-BETKE CALCULATION: 0 % — SIGNIFICANT CHANGE UP (ref 0–0.2)
LDH SERPL L TO P-CCNC: 177 U/L — SIGNIFICANT CHANGE UP (ref 50–242)
LYMPHOCYTES # BLD AUTO: 1.95 K/UL — SIGNIFICANT CHANGE UP (ref 1–3.3)
LYMPHOCYTES # BLD AUTO: 14 % — SIGNIFICANT CHANGE UP (ref 13–44)
MAGNESIUM SERPL-MCNC: 5.1 MG/DL — HIGH (ref 1.6–2.6)
MAGNESIUM SERPL-MCNC: 5.3 MG/DL — HIGH (ref 1.6–2.6)
MANUAL SMEAR VERIFICATION: SIGNIFICANT CHANGE UP
MCHC RBC-ENTMCNC: 30.3 PG — SIGNIFICANT CHANGE UP (ref 27–34)
MCHC RBC-ENTMCNC: 30.4 PG — SIGNIFICANT CHANGE UP (ref 27–34)
MCHC RBC-ENTMCNC: 33.2 G/DL — SIGNIFICANT CHANGE UP (ref 32–36)
MCHC RBC-ENTMCNC: 33.8 G/DL — SIGNIFICANT CHANGE UP (ref 32–36)
MCV RBC AUTO: 90.1 FL — SIGNIFICANT CHANGE UP (ref 80–100)
MCV RBC AUTO: 91.4 FL — SIGNIFICANT CHANGE UP (ref 80–100)
METAMYELOCYTES # FLD: 0.9 % — HIGH (ref 0–0)
METAMYELOCYTES NFR BLD: 0.9 % — HIGH (ref 0–0)
MONOCYTES # BLD AUTO: 0.36 K/UL — SIGNIFICANT CHANGE UP (ref 0–0.9)
MONOCYTES NFR BLD AUTO: 2.6 % — SIGNIFICANT CHANGE UP (ref 2–14)
NEUTROPHILS # BLD AUTO: 11.49 K/UL — HIGH (ref 1.8–7.4)
NEUTROPHILS NFR BLD AUTO: 74.6 % — SIGNIFICANT CHANGE UP (ref 43–77)
NEUTS BAND # BLD: 7.9 % — SIGNIFICANT CHANGE UP (ref 0–8)
NEUTS BAND NFR BLD: 7.9 % — SIGNIFICANT CHANGE UP (ref 0–8)
NRBC BLD AUTO-RTO: 0 /100 WBCS — SIGNIFICANT CHANGE UP (ref 0–0)
PLAT MORPH BLD: NORMAL — SIGNIFICANT CHANGE UP
PLATELET # BLD AUTO: 141 K/UL — LOW (ref 150–400)
PLATELET # BLD AUTO: 147 K/UL — LOW (ref 150–400)
POTASSIUM SERPL-MCNC: 3.5 MMOL/L — SIGNIFICANT CHANGE UP (ref 3.5–5.3)
POTASSIUM SERPL-SCNC: 3.5 MMOL/L — SIGNIFICANT CHANGE UP (ref 3.5–5.3)
PROT SERPL-MCNC: 4.8 G/DL — LOW (ref 6–8.3)
RBC # BLD: 2.44 M/UL — LOW (ref 3.8–5.2)
RBC # BLD: 2.53 M/UL — LOW (ref 3.8–5.2)
RBC # FLD: 13.8 % — SIGNIFICANT CHANGE UP (ref 10.3–14.5)
RBC # FLD: 14.2 % — SIGNIFICANT CHANGE UP (ref 10.3–14.5)
RBC BLD AUTO: SIGNIFICANT CHANGE UP
SODIUM SERPL-SCNC: 135 MMOL/L — SIGNIFICANT CHANGE UP (ref 135–145)
URATE SERPL-MCNC: 3.7 MG/DL — SIGNIFICANT CHANGE UP (ref 2.5–7)
WBC # BLD: 13.93 K/UL — HIGH (ref 3.8–10.5)
WBC # BLD: 14.15 K/UL — HIGH (ref 3.8–10.5)
WBC # FLD AUTO: 13.93 K/UL — HIGH (ref 3.8–10.5)
WBC # FLD AUTO: 14.15 K/UL — HIGH (ref 3.8–10.5)

## 2025-04-19 RX ORDER — IBUPROFEN 200 MG
600 TABLET ORAL EVERY 6 HOURS
Refills: 0 | Status: DISCONTINUED | OUTPATIENT
Start: 2025-04-19 | End: 2025-04-21

## 2025-04-19 RX ADMIN — Medication 975 MILLIGRAM(S): at 11:30

## 2025-04-19 RX ADMIN — Medication 975 MILLIGRAM(S): at 11:48

## 2025-04-19 RX ADMIN — KETOROLAC TROMETHAMINE 30 MILLIGRAM(S): 30 INJECTION, SOLUTION INTRAMUSCULAR; INTRAVENOUS at 03:41

## 2025-04-19 RX ADMIN — Medication 975 MILLIGRAM(S): at 17:33

## 2025-04-19 RX ADMIN — Medication 975 MILLIGRAM(S): at 06:15

## 2025-04-19 RX ADMIN — Medication 600 MILLIGRAM(S): at 15:17

## 2025-04-19 RX ADMIN — Medication 600 MILLIGRAM(S): at 20:28

## 2025-04-19 RX ADMIN — HEPARIN SODIUM 5000 UNIT(S): 1000 INJECTION INTRAVENOUS; SUBCUTANEOUS at 06:01

## 2025-04-19 RX ADMIN — Medication 600 MILLIGRAM(S): at 21:30

## 2025-04-19 RX ADMIN — KETOROLAC TROMETHAMINE 30 MILLIGRAM(S): 30 INJECTION, SOLUTION INTRAMUSCULAR; INTRAVENOUS at 02:03

## 2025-04-19 RX ADMIN — Medication 975 MILLIGRAM(S): at 00:15

## 2025-04-19 RX ADMIN — KETOROLAC TROMETHAMINE 30 MILLIGRAM(S): 30 INJECTION, SOLUTION INTRAMUSCULAR; INTRAVENOUS at 08:54

## 2025-04-19 RX ADMIN — HEPARIN SODIUM 5000 UNIT(S): 1000 INJECTION INTRAVENOUS; SUBCUTANEOUS at 17:33

## 2025-04-19 RX ADMIN — Medication 975 MILLIGRAM(S): at 06:02

## 2025-04-19 RX ADMIN — Medication 600 MILLIGRAM(S): at 15:30

## 2025-04-19 RX ADMIN — Medication 975 MILLIGRAM(S): at 23:55

## 2025-04-19 RX ADMIN — KETOROLAC TROMETHAMINE 30 MILLIGRAM(S): 30 INJECTION, SOLUTION INTRAMUSCULAR; INTRAVENOUS at 09:45

## 2025-04-19 NOTE — PROGRESS NOTE ADULT - SUBJECTIVE AND OBJECTIVE BOX
OB Attending Note      S: Pt feeling well, pain controlled. ambulating, and tolerating PO. no dizziness or lightheaded. patient has not yet voided- catheter just removed    Physical exam:    Vital Signs Last 24 Hrs  T(C): 36.6 (2025 12:19), Max: 36.9 (2025 14:13)  T(F): 97.9 (2025 12:19), Max: 98.4 (2025 14:13)  HR: 87 (2025 12:19) (75 - 101)  BP: 104/69 (2025 12:19) (99/58 - 112/73)  BP(mean): 70 (2025 13:00) (70 - 70)  RR: 18 (2025 12:19) (18 - 18)  SpO2: 98% (2025 12:19) (95% - 98%)    Parameters below as of 2025 12:19  Patient On (Oxygen Delivery Method): room air      I&O's Summary    2025 07:01  -  2025 07:00  --------------------------------------------------------  IN: 5200 mL / OUT: 2975 mL / NET: 2225 mL        Gen: NAD  Abdomen: Soft, nontender, no distension , firm uterine fundus at umbilicus.  Incision: Clean, dry, and intact  Scant Lochia  Ext: No calf tenderness    LABS:                        7.7    13.93 )-----------( 141      ( 2025 06:33 )             22.8                         12.5   18.54 )-----------( 184      ( 2025 08:04 )             35.1                         10.9   12.23 )-----------( 180      ( 2025 21:28 )             31.5     Magnesium: 5.1 mg/dL ( @ 06:33)  Magnesium: 5.3 mg/dL ( @ 00:32)  Magnesium: 4.9 mg/dL ( @ 17:53)  Magnesium: 4.2 mg/dL ( @ 13:30)    25 @ 06:33      135  |  103  |  6[L]  ----------------------------<  109[H]  3.5   |  21[L]  |  0.53    25 @ 08:04      137  |  101  |  4[L]  ----------------------------<  116[H]  3.1[L]   |  19[L]  |  0.43[L]    25 @ 21:28      138  |  105  |  5[L]  ----------------------------<  100[H]  3.3[L]   |  19[L]  |  0.40[L]        Ca    6.9[L]      2025 06:33  Ca    8.2[L]      2025 08:04  Ca    8.8      2025 21:28  Mg     5.1[H]     04-19  Mg     5.3[H]     04-19  Mg     4.9[H]     04-18  Mg     4.2[H]     04-18  Mg     3.7[H]     04-18    TPro  4.8[L]  /  Alb  2.7[L]  /  TBili  0.2  /  DBili  x   /  AST  16  /  ALT  7[L]  /  AlkPhos  84  25 @ 06:33  TPro  6.5  /  Alb  3.5  /  TBili  0.4  /  DBili  x   /  AST  19  /  ALT  10  /  AlkPhos  127[H]  25 @ 08:04  TPro  5.9[L]  /  Alb  3.2[L]  /  TBili  0.2  /  DBili  x   /  AST  18  /  ALT  9[L]  /  AlkPhos  110  25 @ 21:28              Assessment and Plan  POD #1 s/p  section  Doing well.  Continue Ambulation/OOB/Venodynes/heparin  Cont Pain medications  Regular diet  desires  circumcision. risks reviewed including but not limited to bleeding, infection, damage to penis, need for future surgery    Geena Alejandre DO            OB Attending Note      S: Pt feeling well, pain controlled. ambulating, and tolerating PO. no dizziness or lightheaded. patient has not yet voided- catheter just removed    Physical exam:    Vital Signs Last 24 Hrs  T(C): 36.6 (2025 12:19), Max: 36.9 (2025 14:13)  T(F): 97.9 (2025 12:19), Max: 98.4 (2025 14:13)  HR: 87 (2025 12:19) (75 - 101)  BP: 104/69 (2025 12:19) (99/58 - 112/73)  BP(mean): 70 (2025 13:00) (70 - 70)  RR: 18 (2025 12:19) (18 - 18)  SpO2: 98% (2025 12:19) (95% - 98%)    Parameters below as of 2025 12:19  Patient On (Oxygen Delivery Method): room air      I&O's Summary    2025 07:01  -  2025 07:00  --------------------------------------------------------  IN: 5200 mL / OUT: 2975 mL / NET: 2225 mL        Gen: NAD  Abdomen: Soft, nontender, no distension , firm uterine fundus at umbilicus.  Incision: Clean, dry, and intact  Scant Lochia  Ext: No calf tenderness    LABS:                        7.7    13.93 )-----------( 141      ( 2025 06:33 )             22.8                         12.5   18.54 )-----------( 184      ( 2025 08:04 )             35.1                         10.9   12.23 )-----------( 180      ( 2025 21:28 )             31.5     Magnesium: 5.1 mg/dL ( @ 06:33)  Magnesium: 5.3 mg/dL ( @ 00:32)  Magnesium: 4.9 mg/dL ( @ 17:53)  Magnesium: 4.2 mg/dL ( @ 13:30)    25 @ 06:33      135  |  103  |  6[L]  ----------------------------<  109[H]  3.5   |  21[L]  |  0.53    25 @ 08:04      137  |  101  |  4[L]  ----------------------------<  116[H]  3.1[L]   |  19[L]  |  0.43[L]    25 @ 21:28      138  |  105  |  5[L]  ----------------------------<  100[H]  3.3[L]   |  19[L]  |  0.40[L]        Ca    6.9[L]      2025 06:33  Ca    8.2[L]      2025 08:04  Ca    8.8      2025 21:28  Mg     5.1[H]     04-19  Mg     5.3[H]     04-19  Mg     4.9[H]     04-18  Mg     4.2[H]     04-18  Mg     3.7[H]     04-18    TPro  4.8[L]  /  Alb  2.7[L]  /  TBili  0.2  /  DBili  x   /  AST  16  /  ALT  7[L]  /  AlkPhos  84  25 @ 06:33  TPro  6.5  /  Alb  3.5  /  TBili  0.4  /  DBili  x   /  AST  19  /  ALT  10  /  AlkPhos  127[H]  25 @ 08:04  TPro  5.9[L]  /  Alb  3.2[L]  /  TBili  0.2  /  DBili  x   /  AST  18  /  ALT  9[L]  /  AlkPhos  110  25 @ 21:28              Assessment and Plan  POD #1 s/p  section s/p magnesium for PEC severe features  Doing well.  Continue Ambulation/OOB/Venodynes/heparin  Cont Pain medications  Regular diet  desires  circumcision. risks reviewed including but not limited to bleeding, infection, damage to penis, need for future surgery  Bps low - holding BP meds    Geena Alejandre DO

## 2025-04-19 NOTE — PROGRESS NOTE ADULT - SUBJECTIVE AND OBJECTIVE BOX
Day 1 of Anesthesia Pain Management Service    SUBJECTIVE:  Pain Scale Score:          [X] Refer to charted pain scores    THERAPY:    s/p neuraxial PF morphine    MEDICATIONS  (STANDING):  acetaminophen     Tablet .. 975 milliGRAM(s) Oral <User Schedule>  diphtheria/tetanus/pertussis (acellular) Vaccine (Adacel) 0.5 milliLiter(s) IntraMuscular once  heparin   Injectable 5000 Unit(s) SubCutaneous two times a day  ibuprofen  Tablet. 600 milliGRAM(s) Oral every 6 hours  influenza   Vaccine 0.5 milliLiter(s) IntraMuscular once  lactated ringers. 1000 milliLiter(s) (125 mL/Hr) IV Continuous <Continuous>  morphine PF Epidural 2 milliGRAM(s) Epidural once  NIFEdipine XL 30 milliGRAM(s) Oral daily  oxytocin Infusion 42 milliUNIT(s)/Min (42 mL/Hr) IV Continuous <Continuous>    MEDICATIONS  (PRN):  dexAMETHasone  Injectable 4 milliGRAM(s) IV Push every 6 hours PRN Nausea  diphenhydrAMINE 25 milliGRAM(s) Oral every 6 hours PRN Pruritus  lanolin Ointment 1 Application(s) Topical every 6 hours PRN Sore Nipples  magnesium hydroxide Suspension 30 milliLiter(s) Oral two times a day PRN Constipation  nalbuphine Injectable 2.5 milliGRAM(s) IV Push every 6 hours PRN Pruritus  naloxone Injectable 0.1 milliGRAM(s) IV Push every 3 minutes PRN For ANY of the following changes in patient status:  A. Breaths Per Minute LESS THAN 10, B. Oxygen saturation LESS THAN 90%, C. Sedation score of 6 for Stop After: 4 Times  ondansetron Injectable 4 milliGRAM(s) IV Push every 6 hours PRN Nausea  oxyCODONE    IR 5 milliGRAM(s) Oral every 3 hours PRN Moderate to Severe Pain (4-10)  oxyCODONE    IR 5 milliGRAM(s) Oral once PRN Moderate to Severe Pain (4-10)  simethicone 80 milliGRAM(s) Chew every 4 hours PRN Gas      OBJECTIVE:    Sedation:        	[X] Alert	[ ] Drowsy	[ ] Arousable      [ ] Asleep       [ ] Unresponsive    Side Effects:	[X] None	[ ] Nausea	[ ] Vomiting         [ ] Pruritus  		[ ] Weakness            [ ] Numbness	          [ ] Other:    Vital Signs Last 24 Hrs  T(C): 36.7 (19 Apr 2025 08:45), Max: 36.9 (18 Apr 2025 11:20)  T(F): 98.1 (19 Apr 2025 08:45), Max: 98.4 (18 Apr 2025 11:20)  HR: 86 (19 Apr 2025 08:45) (75 - 111)  BP: 103/63 (19 Apr 2025 08:45) (99/58 - 113/75)  BP(mean): 70 (18 Apr 2025 13:00) (70 - 87)  RR: 18 (19 Apr 2025 08:45) (18 - 18)  SpO2: 95% (19 Apr 2025 08:45) (95% - 98%)    Parameters below as of 19 Apr 2025 08:45  Patient On (Oxygen Delivery Method): room air        ASSESSMENT/ PLAN  [X] Patient transitioned to prn analgesics  [X] Pain management per primary service, pain service to sign off   [X]Documentation and Verification of current medications

## 2025-04-19 NOTE — PROVIDER CONTACT NOTE (OTHER) - SITUATION
Patient accompanied to the bathroom and assisted with pericare. Clots noted on pad and in the hat in the toilet.

## 2025-04-19 NOTE — PROGRESS NOTE ADULT - SUBJECTIVE AND OBJECTIVE BOX
R3 Progress Note    Patient seen and examined at bedside, no acute overnight events. No acute complaints, pain well controlled. Patient is ambulating and tolerating regular diet. Has not yet passed flatus. Crockett is still in place. Denies CP, SOB, N/V, HA, blurred vision, epigastric pain.    Vital Signs Last 24 Hours  T(C): 36.4 (04-19-25 @ 00:00), Max: 36.9 (04-18-25 @ 06:01)  HR: 87 (04-19-25 @ 02:03) (45 - 159)  BP: 106/66 (04-19-25 @ 02:03) (98/51 - 167/84)  RR: 18 (04-19-25 @ 02:03) (15 - 18)  SpO2: 95% (04-19-25 @ 02:03) (83% - 100%)    I&O's Summary    17 Apr 2025 07:01  -  18 Apr 2025 07:00  --------------------------------------------------------  IN: 1475 mL / OUT: 750 mL / NET: 725 mL    18 Apr 2025 07:01  -  19 Apr 2025 02:08  --------------------------------------------------------  IN: 3800 mL / OUT: 2175 mL / NET: 1625 mL        Physical Exam:  General: NAD  Abdomen: Soft, non-tender, non-distended, fundus firm  Incision: Pfannenstiel incision CDI, subcuticular suture closure  Pelvic: Lochia wnl    Labs:    Blood Type: A Negative  Antibody Screen: Positive  RPR: Negative               12.5   18.54 )-----------( 184      ( 04-18 @ 08:04 )             35.1                10.9   12.23 )-----------( 180      ( 04-17 @ 21:28 )             31.5         MEDICATIONS  (STANDING):  acetaminophen     Tablet .. 975 milliGRAM(s) Oral <User Schedule>  diphtheria/tetanus/pertussis (acellular) Vaccine (Adacel) 0.5 milliLiter(s) IntraMuscular once  heparin   Injectable 5000 Unit(s) SubCutaneous two times a day  ibuprofen  Tablet. 600 milliGRAM(s) Oral every 6 hours  influenza   Vaccine 0.5 milliLiter(s) IntraMuscular once  ketorolac   Injectable 30 milliGRAM(s) IV Push every 6 hours  lactated ringers. 1000 milliLiter(s) (125 mL/Hr) IV Continuous <Continuous>  magnesium sulfate Infusion 2 Gm/Hr (50 mL/Hr) IV Continuous <Continuous>  morphine PF Epidural 2 milliGRAM(s) Epidural once  NIFEdipine XL 30 milliGRAM(s) Oral daily  oxytocin Infusion 42 milliUNIT(s)/Min (42 mL/Hr) IV Continuous <Continuous>  oxytocin Infusion. 2 milliUNIT(s)/Min (2 mL/Hr) IV Continuous <Continuous>    MEDICATIONS  (PRN):  dexAMETHasone  Injectable 4 milliGRAM(s) IV Push every 6 hours PRN Nausea  diphenhydrAMINE 25 milliGRAM(s) Oral every 6 hours PRN Pruritus  lanolin Ointment 1 Application(s) Topical every 6 hours PRN Sore Nipples  magnesium hydroxide Suspension 30 milliLiter(s) Oral two times a day PRN Constipation  nalbuphine Injectable 2.5 milliGRAM(s) IV Push every 6 hours PRN Pruritus  naloxone Injectable 0.1 milliGRAM(s) IV Push every 3 minutes PRN For ANY of the following changes in patient status:  A. Breaths Per Minute LESS THAN 10, B. Oxygen saturation LESS THAN 90%, C. Sedation score of 6 for Stop After: 4 Times  ondansetron Injectable 4 milliGRAM(s) IV Push every 6 hours PRN Nausea  oxyCODONE    IR 5 milliGRAM(s) Oral every 3 hours PRN Moderate to Severe Pain (4-10)  oxyCODONE    IR 5 milliGRAM(s) Oral once PRN Moderate to Severe Pain (4-10)  simethicone 80 milliGRAM(s) Chew every 4 hours PRN Gas

## 2025-04-20 LAB
BASOPHILS # BLD AUTO: 0.07 K/UL — SIGNIFICANT CHANGE UP (ref 0–0.2)
BASOPHILS NFR BLD AUTO: 0.4 % — SIGNIFICANT CHANGE UP (ref 0–2)
EOSINOPHIL # BLD AUTO: 0.22 K/UL — SIGNIFICANT CHANGE UP (ref 0–0.5)
EOSINOPHIL NFR BLD AUTO: 1.2 % — SIGNIFICANT CHANGE UP (ref 0–6)
HCT VFR BLD CALC: 24.9 % — LOW (ref 34.5–45)
HGB BLD-MCNC: 8.1 G/DL — LOW (ref 11.5–15.5)
IMM GRANULOCYTES NFR BLD AUTO: 1.2 % — HIGH (ref 0–0.9)
LYMPHOCYTES # BLD AUTO: 16.9 % — SIGNIFICANT CHANGE UP (ref 13–44)
LYMPHOCYTES # BLD AUTO: 3.23 K/UL — SIGNIFICANT CHANGE UP (ref 1–3.3)
MCHC RBC-ENTMCNC: 30.2 PG — SIGNIFICANT CHANGE UP (ref 27–34)
MCHC RBC-ENTMCNC: 32.5 G/DL — SIGNIFICANT CHANGE UP (ref 32–36)
MCV RBC AUTO: 92.9 FL — SIGNIFICANT CHANGE UP (ref 80–100)
MONOCYTES # BLD AUTO: 0.88 K/UL — SIGNIFICANT CHANGE UP (ref 0–0.9)
MONOCYTES NFR BLD AUTO: 4.6 % — SIGNIFICANT CHANGE UP (ref 2–14)
NEUTROPHILS # BLD AUTO: 14.5 K/UL — HIGH (ref 1.8–7.4)
NEUTROPHILS NFR BLD AUTO: 75.7 % — SIGNIFICANT CHANGE UP (ref 43–77)
NRBC BLD AUTO-RTO: 0 /100 WBCS — SIGNIFICANT CHANGE UP (ref 0–0)
PLATELET # BLD AUTO: 203 K/UL — SIGNIFICANT CHANGE UP (ref 150–400)
RBC # BLD: 2.68 M/UL — LOW (ref 3.8–5.2)
RBC # FLD: 14.2 % — SIGNIFICANT CHANGE UP (ref 10.3–14.5)
WBC # BLD: 19.13 K/UL — HIGH (ref 3.8–10.5)
WBC # FLD AUTO: 19.13 K/UL — HIGH (ref 3.8–10.5)

## 2025-04-20 RX ORDER — ACETAMINOPHEN 500 MG/5ML
3 LIQUID (ML) ORAL
Qty: 0 | Refills: 0 | DISCHARGE
Start: 2025-04-20

## 2025-04-20 RX ORDER — IBUPROFEN 200 MG
1 TABLET ORAL
Qty: 0 | Refills: 0 | DISCHARGE
Start: 2025-04-20

## 2025-04-20 RX ORDER — NIFEDIPINE 30 MG
1 TABLET, EXTENDED RELEASE 24 HR ORAL
Qty: 0 | Refills: 0 | DISCHARGE
Start: 2025-04-20

## 2025-04-20 RX ADMIN — Medication 975 MILLIGRAM(S): at 05:54

## 2025-04-20 RX ADMIN — Medication 975 MILLIGRAM(S): at 18:47

## 2025-04-20 RX ADMIN — Medication 600 MILLIGRAM(S): at 15:16

## 2025-04-20 RX ADMIN — Medication 975 MILLIGRAM(S): at 00:23

## 2025-04-20 RX ADMIN — Medication 600 MILLIGRAM(S): at 21:04

## 2025-04-20 RX ADMIN — Medication 600 MILLIGRAM(S): at 16:15

## 2025-04-20 RX ADMIN — Medication 600 MILLIGRAM(S): at 02:52

## 2025-04-20 RX ADMIN — Medication 975 MILLIGRAM(S): at 06:31

## 2025-04-20 RX ADMIN — Medication 600 MILLIGRAM(S): at 09:33

## 2025-04-20 RX ADMIN — Medication 975 MILLIGRAM(S): at 17:49

## 2025-04-20 RX ADMIN — Medication 975 MILLIGRAM(S): at 13:20

## 2025-04-20 RX ADMIN — Medication 600 MILLIGRAM(S): at 02:48

## 2025-04-20 RX ADMIN — Medication 600 MILLIGRAM(S): at 08:33

## 2025-04-20 RX ADMIN — MAGNESIUM HYDROXIDE 30 MILLILITER(S): 400 SUSPENSION ORAL at 15:17

## 2025-04-20 RX ADMIN — HEPARIN SODIUM 5000 UNIT(S): 1000 INJECTION INTRAVENOUS; SUBCUTANEOUS at 17:48

## 2025-04-20 RX ADMIN — Medication 975 MILLIGRAM(S): at 12:20

## 2025-04-20 RX ADMIN — Medication 30 MILLIGRAM(S): at 08:32

## 2025-04-20 RX ADMIN — HEPARIN SODIUM 5000 UNIT(S): 1000 INJECTION INTRAVENOUS; SUBCUTANEOUS at 05:54

## 2025-04-20 NOTE — PROGRESS NOTE ADULT - SUBJECTIVE AND OBJECTIVE BOX
OB Progress Note:  Delivery, POD#2    S: Patient feels well. Pain is well controlled. She is tolerating a regular diet and passing flatus. She is voiding spontaneously and ambulating without difficulty. Endorses vaginal bleeding, soaking < 1 pad/hour. Denies CP/SOB, lightheadedness/dizziness, N/V.    MEDICATIONS  (STANDING):  acetaminophen     Tablet .. 975 milliGRAM(s) Oral <User Schedule>  diphtheria/tetanus/pertussis (acellular) Vaccine (Adacel) 0.5 milliLiter(s) IntraMuscular once  heparin   Injectable 5000 Unit(s) SubCutaneous two times a day  ibuprofen  Tablet. 600 milliGRAM(s) Oral every 6 hours  influenza   Vaccine 0.5 milliLiter(s) IntraMuscular once  lactated ringers. 1000 milliLiter(s) (125 mL/Hr) IV Continuous <Continuous>  NIFEdipine XL 30 milliGRAM(s) Oral daily  oxytocin Infusion 42 milliUNIT(s)/Min (42 mL/Hr) IV Continuous <Continuous>      MEDICATIONS  (PRN):  diphenhydrAMINE 25 milliGRAM(s) Oral every 6 hours PRN Pruritus  lanolin Ointment 1 Application(s) Topical every 6 hours PRN Sore Nipples  magnesium hydroxide Suspension 30 milliLiter(s) Oral two times a day PRN Constipation  oxyCODONE    IR 5 milliGRAM(s) Oral every 3 hours PRN Moderate to Severe Pain (4-10)  oxyCODONE    IR 5 milliGRAM(s) Oral once PRN Moderate to Severe Pain (4-10)  simethicone 80 milliGRAM(s) Chew every 4 hours PRN Gas      O:  Vitals:  Vital Signs Last 24 Hrs  T(C): 36.6 (2025 00:30), Max: 36.9 (2025 16:58)  T(F): 97.9 (2025 00:30), Max: 98.4 (2025 16:58)  HR: 83 (2025 00:30) (83 - 90)  BP: 124/82 (2025 00:30) (103/63 - 124/82)  BP(mean): --  RR: 18 (2025 00:30) (18 - 18)  SpO2: 98% (2025 00:30) (95% - 98%)    Parameters below as of 2025 00:30  Patient On (Oxygen Delivery Method): room air      Physical Exam:  General: NAD  Heart: Clinically well-perfused  Lungs: Breathing comfortably on room air  Abdomen: Soft, non-distended, appropriately tender, fundus firm  Incision: Pfannenstiel incision, clean/dry/intact    Labs:  Blood type: A Negative  Rubella IgG: RPR: Negative                          7.4[L]   14.15[H] >-----------< 147[L]    (  @ 12:21 )             22.3[L]                        7.7[L]   13.93[H] >-----------< 141[L]    (  @ 06:33 )             22.8[L]                        12.5   18.54[H] >-----------< 184    (  @ 08:04 )             35.1                        10.9[L]   12.23[H] >-----------< 180    (  @ 21:28 )             31.5[L]    25 @ 06:33      135  |  103  |  6[L]  ----------------------------<  109[H]  3.5   |  21[L]  |  0.53    25 @ 08:04      137  |  101  |  4[L]  ----------------------------<  116[H]  3.1[L]   |  19[L]  |  0.43[L]    25 @ 21:28      138  |  105  |  5[L]  ----------------------------<  100[H]  3.3[L]   |  19[L]  |  0.40[L]        Ca    6.9[L]      2025 06:33  Ca    8.2[L]      2025 08:04  Ca    8.8      2025 21:28  Mg     5.1[H]     -19  Mg     5.3[H]     -  Mg     4.9[H]       Mg     4.2[H]     -18  Mg     3.7[H]     -18    TPro  4.8[L]  /  Alb  2.7[L]  /  TBili  0.2  /  DBili  x   /  AST  16  /  ALT  7[L]  /  AlkPhos  84  25 @ 06:33  TPro  6.5  /  Alb  3.5  /  TBili  0.4  /  DBili  x   /  AST  19  /  ALT  10  /  AlkPhos  127[H]  25 @ 08:04  TPro  5.9[L]  /  Alb  3.2[L]  /  TBili  0.2  /  DBili  x   /  AST  18  /  ALT  9[L]  /  AlkPhos  110  25 @ 21:28

## 2025-04-21 VITALS
TEMPERATURE: 98 F | DIASTOLIC BLOOD PRESSURE: 86 MMHG | HEART RATE: 81 BPM | RESPIRATION RATE: 18 BRPM | OXYGEN SATURATION: 98 % | SYSTOLIC BLOOD PRESSURE: 130 MMHG

## 2025-04-21 PROCEDURE — 82570 ASSAY OF URINE CREATININE: CPT

## 2025-04-21 PROCEDURE — 86780 TREPONEMA PALLIDUM: CPT

## 2025-04-21 PROCEDURE — 59050 FETAL MONITOR W/REPORT: CPT

## 2025-04-21 PROCEDURE — 88307 TISSUE EXAM BY PATHOLOGIST: CPT

## 2025-04-21 PROCEDURE — 83735 ASSAY OF MAGNESIUM: CPT

## 2025-04-21 PROCEDURE — 80053 COMPREHEN METABOLIC PANEL: CPT

## 2025-04-21 PROCEDURE — 86900 BLOOD TYPING SEROLOGIC ABO: CPT

## 2025-04-21 PROCEDURE — 36415 COLL VENOUS BLD VENIPUNCTURE: CPT

## 2025-04-21 PROCEDURE — 85027 COMPLETE CBC AUTOMATED: CPT

## 2025-04-21 PROCEDURE — 81001 URINALYSIS AUTO W/SCOPE: CPT

## 2025-04-21 PROCEDURE — 85025 COMPLETE CBC W/AUTO DIFF WBC: CPT

## 2025-04-21 PROCEDURE — 84550 ASSAY OF BLOOD/URIC ACID: CPT

## 2025-04-21 PROCEDURE — 86850 RBC ANTIBODY SCREEN: CPT

## 2025-04-21 PROCEDURE — 83615 LACTATE (LD) (LDH) ENZYME: CPT

## 2025-04-21 PROCEDURE — 59025 FETAL NON-STRESS TEST: CPT

## 2025-04-21 PROCEDURE — 84156 ASSAY OF PROTEIN URINE: CPT

## 2025-04-21 PROCEDURE — 86870 RBC ANTIBODY IDENTIFICATION: CPT

## 2025-04-21 PROCEDURE — 85460 HEMOGLOBIN FETAL: CPT

## 2025-04-21 PROCEDURE — 86901 BLOOD TYPING SEROLOGIC RH(D): CPT

## 2025-04-21 RX ORDER — OXYCODONE HYDROCHLORIDE 30 MG/1
1 TABLET ORAL
Qty: 0 | Refills: 0 | DISCHARGE
Start: 2025-04-21

## 2025-04-21 RX ORDER — FERROUS SULFATE 137(45) MG
325 TABLET, EXTENDED RELEASE ORAL DAILY
Refills: 0 | Status: DISCONTINUED | OUTPATIENT
Start: 2025-04-21 | End: 2025-04-21

## 2025-04-21 RX ADMIN — Medication 600 MILLIGRAM(S): at 03:16

## 2025-04-21 RX ADMIN — Medication 975 MILLIGRAM(S): at 12:39

## 2025-04-21 RX ADMIN — Medication 325 MILLIGRAM(S): at 11:48

## 2025-04-21 RX ADMIN — Medication 600 MILLIGRAM(S): at 14:32

## 2025-04-21 RX ADMIN — Medication 975 MILLIGRAM(S): at 11:49

## 2025-04-21 RX ADMIN — HEPARIN SODIUM 5000 UNIT(S): 1000 INJECTION INTRAVENOUS; SUBCUTANEOUS at 06:30

## 2025-04-21 RX ADMIN — Medication 975 MILLIGRAM(S): at 06:29

## 2025-04-21 RX ADMIN — Medication 30 MILLIGRAM(S): at 09:04

## 2025-04-21 RX ADMIN — Medication 600 MILLIGRAM(S): at 15:20

## 2025-04-21 RX ADMIN — Medication 500 MILLIGRAM(S): at 11:48

## 2025-04-21 RX ADMIN — Medication 600 MILLIGRAM(S): at 08:27

## 2025-04-21 RX ADMIN — Medication 975 MILLIGRAM(S): at 00:30

## 2025-04-21 RX ADMIN — Medication 600 MILLIGRAM(S): at 09:15

## 2025-04-21 NOTE — PROGRESS NOTE ADULT - ATTENDING COMMENTS
33 yo POD#2 s/p PCS for Cat 2 FHT c/b PECwSF on Procardia 30 mg XL daily. BPs well controlled. Pt is s/p magnesium sulfate gtt. Continue to monitor BPs. Continue post-op care. Plan for dc home tomorrow AM.
POD3 s/p  section, doing well.  -BPs well controlled with procardia. BP elevated this AM when due for medication and procardia given at time of elevation. Will check one more BP to make sure goes down and then discharge home. BP parameters reviewed and calling parameters reviewed. Pt to fu in office in 1 week for BP check. Will also set up with cardio OB.   -Routine postoperative care  -Discharge home   -Instructions given   -Follow-up in office 1 week for BP check and 2 weeks for inicision check.     Carine Hutchinson DO

## 2025-04-21 NOTE — PROGRESS NOTE ADULT - ASSESSMENT
A/P: 34y  POD#1 from pLTCS / cat 2 (QBL 725cc) c/b sPEC (BPs). BPs normotensive overnight on [id number]. Pt recovering well overall post-operatively.    #sPEC (BPs)  -C/w Mg bolus and gtt x24h for seizure ppx   -[id number] qd  -HELLP wnl, P/C 0.2  -f/u AM HELLP ()  -s/p Pro10 ()    #Post-operative state  -Reg diet  -HSQ/SCDs for DVT ppx  -PO analgesia  -S/p Ancef x1    VTimmel PGY3
A/P: 34y  POD#2 from pLTCS / cat 2 (QBL 725cc) c/b sPEC (BPs). BPs well controlled on [id number]. Pt recovering well overall post-operatively.    #sPEC (BPs)  -C/w Mg bolus and gtt x24h for seizure ppx   -[id number] qd  -HELLP wnl, P/C 0.2  -f/u AM HELLP ()  -s/p Pro10 ()    #Post-operative state  -Reg diet  - H/H trend as above, f/u AM labs  -HSQ/SCDs for DVT ppx  -PO analgesia  -S/p Ancef x1    HSinks PGY3
A/P: 34y  POD#3 from pLTCS 2/2 cat 2 (QBL 725cc) c/b sPEC (BPs). BPs well controlled on [id number]. Pt recovering well overall post-operatively. BPs normotensive overnight.    #sPEC (BPs)  -S/p Mg bolus and gtt x24h for seizure ppx   -[id number] qd  -HELLP wnl, P/C 0.2  -s/p Pro10 ()  -Continue to monitor for si/sx of sPEC    #Post-operative state  -Reg diet  -Hct 31.5->35.1->22.8->22.3->24.9, stable  -Fe/Vit C  -HSQ/SCDs for DVT ppx  -PO analgesia  -S/p Ancef x1    VTimmel PGY3

## 2025-04-21 NOTE — PROGRESS NOTE ADULT - SUBJECTIVE AND OBJECTIVE BOX
R3 Progress Note    Patient seen and examined at bedside, no acute overnight events. No acute complaints, pain well controlled. Patient is ambulating and tolerating regular diet. Voiding spontaneously and passing flatus. Denies CP, SOB, N/V, HA, blurred vision, epigastric pain.    Vital Signs Last 24 Hours  T(C): 36.6 (04-21-25 @ 00:41), Max: 36.9 (04-20-25 @ 21:19)  HR: 88 (04-21-25 @ 00:41) (74 - 93)  BP: 137/89 (04-21-25 @ 00:41) (116/71 - 137/89)  RR: 18 (04-21-25 @ 00:41) (18 - 18)  SpO2: 98% (04-21-25 @ 00:41) (96% - 98%)    I&O's Summary    19 Apr 2025 07:01  -  20 Apr 2025 07:00  --------------------------------------------------------  IN: 0 mL / OUT: 800 mL / NET: -800 mL        Physical Exam:  General: NAD  Abdomen: Soft, non-tender, non-distended, fundus firm  Incision: Pfannenstiel incision CDI, subcuticular suture closure  Pelvic: Lochia wnl    Labs:    Blood Type: A Negative  Antibody Screen: Positive  RPR: Negative               8.1    19.13 )-----------( 203      ( 04-20 @ 06:45 )             24.9                7.4    14.15 )-----------( 147      ( 04-19 @ 12:21 )             22.3                7.7    13.93 )-----------( 141      ( 04-19 @ 06:33 )             22.8         MEDICATIONS  (STANDING):  acetaminophen     Tablet .. 975 milliGRAM(s) Oral <User Schedule>  diphtheria/tetanus/pertussis (acellular) Vaccine (Adacel) 0.5 milliLiter(s) IntraMuscular once  heparin   Injectable 5000 Unit(s) SubCutaneous two times a day  ibuprofen  Tablet. 600 milliGRAM(s) Oral every 6 hours  influenza   Vaccine 0.5 milliLiter(s) IntraMuscular once  lactated ringers. 1000 milliLiter(s) (125 mL/Hr) IV Continuous <Continuous>  NIFEdipine XL 30 milliGRAM(s) Oral daily  oxytocin Infusion 42 milliUNIT(s)/Min (42 mL/Hr) IV Continuous <Continuous>    MEDICATIONS  (PRN):  diphenhydrAMINE 25 milliGRAM(s) Oral every 6 hours PRN Pruritus  lanolin Ointment 1 Application(s) Topical every 6 hours PRN Sore Nipples  magnesium hydroxide Suspension 30 milliLiter(s) Oral two times a day PRN Constipation  oxyCODONE    IR 5 milliGRAM(s) Oral every 3 hours PRN Moderate to Severe Pain (4-10)  oxyCODONE    IR 5 milliGRAM(s) Oral once PRN Moderate to Severe Pain (4-10)  simethicone 80 milliGRAM(s) Chew every 4 hours PRN Gas

## 2025-04-22 PROBLEM — O03.9 COMPLETE OR UNSPECIFIED SPONTANEOUS ABORTION WITHOUT COMPLICATION: Chronic | Status: ACTIVE | Noted: 2025-04-17

## 2025-04-28 LAB — SURGICAL PATHOLOGY STUDY: SIGNIFICANT CHANGE UP

## 2025-04-30 ENCOUNTER — APPOINTMENT (OUTPATIENT)
Dept: CARDIOLOGY | Facility: CLINIC | Age: 35
End: 2025-04-30
Payer: COMMERCIAL

## 2025-04-30 ENCOUNTER — NON-APPOINTMENT (OUTPATIENT)
Age: 35
End: 2025-04-30

## 2025-04-30 VITALS
OXYGEN SATURATION: 99 % | SYSTOLIC BLOOD PRESSURE: 116 MMHG | BODY MASS INDEX: 28.25 KG/M2 | WEIGHT: 180 LBS | DIASTOLIC BLOOD PRESSURE: 80 MMHG | HEIGHT: 67 IN | HEART RATE: 95 BPM

## 2025-04-30 DIAGNOSIS — Z78.9 OTHER SPECIFIED HEALTH STATUS: ICD-10-CM

## 2025-04-30 DIAGNOSIS — Z82.49 FAMILY HISTORY OF ISCHEMIC HEART DISEASE AND OTHER DISEASES OF THE CIRCULATORY SYSTEM: ICD-10-CM

## 2025-04-30 DIAGNOSIS — O14.13 SEVERE PRE-ECLAMPSIA, THIRD TRIMESTER: ICD-10-CM

## 2025-04-30 PROCEDURE — 99204 OFFICE O/P NEW MOD 45 MIN: CPT | Mod: 25

## 2025-04-30 PROCEDURE — 93000 ELECTROCARDIOGRAM COMPLETE: CPT

## 2025-04-30 RX ORDER — NIFEDIPINE 10 MG/1
CAPSULE ORAL
Refills: 0 | Status: ACTIVE | COMMUNITY

## 2025-04-30 RX ORDER — IBUPROFEN 600 MG/1
600 TABLET, FILM COATED ORAL EVERY 6 HOURS
Refills: 0 | Status: ACTIVE | COMMUNITY

## 2025-07-08 NOTE — OB NEONATOLOGY/PEDIATRICIAN DELIVERY SUMMARY - NSLABSCHECK_OBGYN_ALL_OB
"Sary seems to have developed a likely secondary infection due to all of the congestion in his nose. Now that he is spiking fevers daily, I believe we need to treat him with oral antibiotics. His ear tubes are not blocked and his ears look great. His right ear had a very small amount of clear fluid. Please let us know if his symptoms worsen or fevers are continuing.     Continue good hydration and making sure he has good wet diapers.      As dicussed if you wanted to you could start on a probiotic.  Probiotics for infants and children are increasingly studied for health benefits to the GI tract , as they replace healthy gut mena bacteria to help us digest food.   Common safe brands include: Culturelle, Floristor, Florigen.   For infants, the brand \"Mother's Bliss\" is popular.    " No Yes